# Patient Record
Sex: FEMALE | Race: WHITE | HISPANIC OR LATINO | Employment: OTHER | URBAN - METROPOLITAN AREA
[De-identification: names, ages, dates, MRNs, and addresses within clinical notes are randomized per-mention and may not be internally consistent; named-entity substitution may affect disease eponyms.]

---

## 2023-03-19 ENCOUNTER — HOSPITAL ENCOUNTER (EMERGENCY)
Facility: HOSPITAL | Age: 58
Discharge: HOME | End: 2023-03-20
Attending: STUDENT IN AN ORGANIZED HEALTH CARE EDUCATION/TRAINING PROGRAM | Admitting: STUDENT IN AN ORGANIZED HEALTH CARE EDUCATION/TRAINING PROGRAM
Payer: MEDICARE

## 2023-03-19 DIAGNOSIS — T50.905A ADVERSE EFFECT OF DRUG, INITIAL ENCOUNTER: ICD-10-CM

## 2023-03-19 DIAGNOSIS — N39.0 ACUTE UTI (URINARY TRACT INFECTION): Primary | ICD-10-CM

## 2023-03-19 DIAGNOSIS — R09.89 VEIN SYMPTOM: ICD-10-CM

## 2023-03-19 LAB
BASOPHILS # BLD AUTO: 0.04 10*3/MM3 (ref 0–0.2)
BASOPHILS NFR BLD AUTO: 0.4 % (ref 0–1.5)
DEPRECATED RDW RBC AUTO: 40.6 FL (ref 37–54)
EOSINOPHIL # BLD AUTO: 0.12 10*3/MM3 (ref 0–0.4)
EOSINOPHIL NFR BLD AUTO: 1.1 % (ref 0.3–6.2)
ERYTHROCYTE [DISTWIDTH] IN BLOOD BY AUTOMATED COUNT: 11.6 % (ref 12.3–15.4)
HCT VFR BLD AUTO: 41 % (ref 34–46.6)
HGB BLD-MCNC: 13.6 G/DL (ref 12–15.9)
IMM GRANULOCYTES # BLD AUTO: 0.04 10*3/MM3 (ref 0–0.05)
IMM GRANULOCYTES NFR BLD AUTO: 0.4 % (ref 0–0.5)
LYMPHOCYTES # BLD AUTO: 3.6 10*3/MM3 (ref 0.7–3.1)
LYMPHOCYTES NFR BLD AUTO: 32.3 % (ref 19.6–45.3)
MCH RBC QN AUTO: 32 PG (ref 26.6–33)
MCHC RBC AUTO-ENTMCNC: 33.2 G/DL (ref 31.5–35.7)
MCV RBC AUTO: 96.5 FL (ref 79–97)
MONOCYTES # BLD AUTO: 0.75 10*3/MM3 (ref 0.1–0.9)
MONOCYTES NFR BLD AUTO: 6.7 % (ref 5–12)
NEUTROPHILS NFR BLD AUTO: 59.1 % (ref 42.7–76)
NEUTROPHILS NFR BLD AUTO: 6.59 10*3/MM3 (ref 1.7–7)
NRBC BLD AUTO-RTO: 0 /100 WBC (ref 0–0.2)
PLATELET # BLD AUTO: 351 10*3/MM3 (ref 140–450)
PMV BLD AUTO: 8.1 FL (ref 6–12)
RBC # BLD AUTO: 4.25 10*6/MM3 (ref 3.77–5.28)
WBC NRBC COR # BLD: 11.14 10*3/MM3 (ref 3.4–10.8)

## 2023-03-19 PROCEDURE — 80143 DRUG ASSAY ACETAMINOPHEN: CPT | Performed by: STUDENT IN AN ORGANIZED HEALTH CARE EDUCATION/TRAINING PROGRAM

## 2023-03-19 PROCEDURE — 84100 ASSAY OF PHOSPHORUS: CPT | Performed by: STUDENT IN AN ORGANIZED HEALTH CARE EDUCATION/TRAINING PROGRAM

## 2023-03-19 PROCEDURE — 80175 DRUG SCREEN QUAN LAMOTRIGINE: CPT | Performed by: STUDENT IN AN ORGANIZED HEALTH CARE EDUCATION/TRAINING PROGRAM

## 2023-03-19 PROCEDURE — 82550 ASSAY OF CK (CPK): CPT | Performed by: STUDENT IN AN ORGANIZED HEALTH CARE EDUCATION/TRAINING PROGRAM

## 2023-03-19 PROCEDURE — 81001 URINALYSIS AUTO W/SCOPE: CPT | Performed by: STUDENT IN AN ORGANIZED HEALTH CARE EDUCATION/TRAINING PROGRAM

## 2023-03-19 PROCEDURE — 99284 EMERGENCY DEPT VISIT MOD MDM: CPT

## 2023-03-19 PROCEDURE — 82077 ASSAY SPEC XCP UR&BREATH IA: CPT | Performed by: STUDENT IN AN ORGANIZED HEALTH CARE EDUCATION/TRAINING PROGRAM

## 2023-03-19 PROCEDURE — 83735 ASSAY OF MAGNESIUM: CPT | Performed by: STUDENT IN AN ORGANIZED HEALTH CARE EDUCATION/TRAINING PROGRAM

## 2023-03-19 PROCEDURE — 85025 COMPLETE CBC W/AUTO DIFF WBC: CPT | Performed by: STUDENT IN AN ORGANIZED HEALTH CARE EDUCATION/TRAINING PROGRAM

## 2023-03-19 PROCEDURE — 84443 ASSAY THYROID STIM HORMONE: CPT | Performed by: STUDENT IN AN ORGANIZED HEALTH CARE EDUCATION/TRAINING PROGRAM

## 2023-03-19 PROCEDURE — 80053 COMPREHEN METABOLIC PANEL: CPT | Performed by: STUDENT IN AN ORGANIZED HEALTH CARE EDUCATION/TRAINING PROGRAM

## 2023-03-19 PROCEDURE — 80306 DRUG TEST PRSMV INSTRMNT: CPT | Performed by: STUDENT IN AN ORGANIZED HEALTH CARE EDUCATION/TRAINING PROGRAM

## 2023-03-19 PROCEDURE — 80179 DRUG ASSAY SALICYLATE: CPT | Performed by: STUDENT IN AN ORGANIZED HEALTH CARE EDUCATION/TRAINING PROGRAM

## 2023-03-19 PROCEDURE — 84439 ASSAY OF FREE THYROXINE: CPT | Performed by: STUDENT IN AN ORGANIZED HEALTH CARE EDUCATION/TRAINING PROGRAM

## 2023-03-19 RX ORDER — DIPHENHYDRAMINE HYDROCHLORIDE 50 MG/ML
25 INJECTION INTRAMUSCULAR; INTRAVENOUS ONCE
Status: COMPLETED | OUTPATIENT
Start: 2023-03-19 | End: 2023-03-20

## 2023-03-20 VITALS
WEIGHT: 117 LBS | HEIGHT: 62 IN | RESPIRATION RATE: 18 BRPM | OXYGEN SATURATION: 100 % | BODY MASS INDEX: 21.53 KG/M2 | SYSTOLIC BLOOD PRESSURE: 141 MMHG | DIASTOLIC BLOOD PRESSURE: 79 MMHG | HEART RATE: 83 BPM | TEMPERATURE: 98 F

## 2023-03-20 LAB
ALBUMIN SERPL-MCNC: 4 G/DL (ref 3.5–5.2)
ALBUMIN/GLOB SERPL: 1.4 G/DL
ALP SERPL-CCNC: 80 U/L (ref 39–117)
ALT SERPL W P-5'-P-CCNC: 6 U/L (ref 1–33)
AMPHET+METHAMPHET UR QL: NEGATIVE
AMPHETAMINES UR QL: NEGATIVE
ANION GAP SERPL CALCULATED.3IONS-SCNC: 12 MMOL/L (ref 5–15)
APAP SERPL-MCNC: <5 MCG/ML (ref 0–30)
AST SERPL-CCNC: 13 U/L (ref 1–32)
BACTERIA UR QL AUTO: ABNORMAL /HPF
BARBITURATES UR QL SCN: NEGATIVE
BENZODIAZ UR QL SCN: NEGATIVE
BILIRUB SERPL-MCNC: <0.2 MG/DL (ref 0–1.2)
BILIRUB UR QL STRIP: NEGATIVE
BUN SERPL-MCNC: 11 MG/DL (ref 6–20)
BUN/CREAT SERPL: 22 (ref 7–25)
BUPRENORPHINE SERPL-MCNC: NEGATIVE NG/ML
CALCIUM SPEC-SCNC: 8.7 MG/DL (ref 8.6–10.5)
CANNABINOIDS SERPL QL: NEGATIVE
CHLORIDE SERPL-SCNC: 106 MMOL/L (ref 98–107)
CK SERPL-CCNC: 157 U/L (ref 20–180)
CLARITY UR: CLEAR
CO2 SERPL-SCNC: 25 MMOL/L (ref 22–29)
COCAINE UR QL: NEGATIVE
COLOR UR: YELLOW
CREAT SERPL-MCNC: 0.5 MG/DL (ref 0.57–1)
EGFRCR SERPLBLD CKD-EPI 2021: 108.9 ML/MIN/1.73
ETHANOL BLD-MCNC: <10 MG/DL (ref 0–10)
GLOBULIN UR ELPH-MCNC: 2.8 GM/DL
GLUCOSE SERPL-MCNC: 122 MG/DL (ref 65–99)
GLUCOSE UR STRIP-MCNC: NEGATIVE MG/DL
HGB UR QL STRIP.AUTO: ABNORMAL
HYALINE CASTS UR QL AUTO: ABNORMAL /LPF
KETONES UR QL STRIP: NEGATIVE
LEUKOCYTE ESTERASE UR QL STRIP.AUTO: ABNORMAL
MAGNESIUM SERPL-MCNC: 2.2 MG/DL (ref 1.6–2.6)
METHADONE UR QL SCN: NEGATIVE
NITRITE UR QL STRIP: NEGATIVE
OPIATES UR QL: NEGATIVE
OXYCODONE UR QL SCN: NEGATIVE
PCP UR QL SCN: NEGATIVE
PH UR STRIP.AUTO: 6 [PH] (ref 5–8)
PHOSPHATE SERPL-MCNC: 3.3 MG/DL (ref 2.5–4.5)
POTASSIUM SERPL-SCNC: 4.1 MMOL/L (ref 3.5–5.2)
PROPOXYPH UR QL: NEGATIVE
PROT SERPL-MCNC: 6.8 G/DL (ref 6–8.5)
PROT UR QL STRIP: NEGATIVE
RBC # UR STRIP: ABNORMAL /HPF
REF LAB TEST METHOD: ABNORMAL
SALICYLATES SERPL-MCNC: 0.5 MG/DL
SODIUM SERPL-SCNC: 143 MMOL/L (ref 136–145)
SP GR UR STRIP: 1.01 (ref 1–1.03)
SQUAMOUS #/AREA URNS HPF: ABNORMAL /HPF
T4 FREE SERPL-MCNC: 1.05 NG/DL (ref 0.93–1.7)
TRICYCLICS UR QL SCN: NEGATIVE
TSH SERPL DL<=0.05 MIU/L-ACNC: 1.6 UIU/ML (ref 0.27–4.2)
UROBILINOGEN UR QL STRIP: ABNORMAL
WBC # UR STRIP: ABNORMAL /HPF

## 2023-03-20 PROCEDURE — 96361 HYDRATE IV INFUSION ADD-ON: CPT

## 2023-03-20 PROCEDURE — 25010000002 DIPHENHYDRAMINE PER 50 MG: Performed by: STUDENT IN AN ORGANIZED HEALTH CARE EDUCATION/TRAINING PROGRAM

## 2023-03-20 PROCEDURE — 96374 THER/PROPH/DIAG INJ IV PUSH: CPT

## 2023-03-20 RX ORDER — NITROFURANTOIN 25; 75 MG/1; MG/1
100 CAPSULE ORAL 2 TIMES DAILY
Qty: 14 CAPSULE | Refills: 0 | Status: SHIPPED | OUTPATIENT
Start: 2023-03-20

## 2023-03-20 RX ADMIN — DIPHENHYDRAMINE HYDROCHLORIDE 25 MG: 50 INJECTION, SOLUTION INTRAMUSCULAR; INTRAVENOUS at 00:22

## 2023-03-20 RX ADMIN — SODIUM CHLORIDE, POTASSIUM CHLORIDE, SODIUM LACTATE AND CALCIUM CHLORIDE 1000 ML: 600; 310; 30; 20 INJECTION, SOLUTION INTRAVENOUS at 00:22

## 2023-03-20 NOTE — DISCHARGE INSTRUCTIONS
Take the provided antibiotic as directed.  Follow-up with your PCP for possible medication changes your symptoms could be related to that.  While today's work-up was reassuring if your symptoms change or worsen please return to the ED or seek other medical care.

## 2023-03-22 LAB — LAMOTRIGINE SERPL-MCNC: <1 UG/ML (ref 2–20)

## 2023-03-22 NOTE — ED PROVIDER NOTES
EMERGENCY DEPARTMENT ENCOUNTER    Pt Name: Sarah Beth Patel  MRN: [id number]  Pt :   1965  Room Number:  HAL1/HA  Date of encounter:  3/19/2023  PCP: Mervat Chang MD  ED Provider: Juan Yan MD    Historian: Patient      HPI:  Chief Complaint: Diffuse myalgias, veins swelling, drug reaction        Context: Sarah Beth Patel is a 58-year-old woman who presents with a constellation of symptoms she believes may be a drug reaction.  She says on Monday nearly a week ago she was started on lisinopril HCTZ.  She had done well with that medication through the week but then yesterday started taking lamotrigine which she was written by her psychiatrist for and since then feels like she has been having pain in her veins and swelling in her veins.  She feels like the pain is diffuse at 1 point she felt like there was a ball of ice in her veins.  She says before she came here she felt like there was more swelling but that it has gotten better since she called the ambulance.  She was worried that she was having a medication reaction so she presents here for evaluation.  She continues to have the sensation that her veins are swollen but denies actual pain.  No other complaints at this time.    Note: Patient is Azeri-speaking and both initial history and reevaluation and discharge were performed with a formal .    PAST MEDICAL HISTORY  No past medical history on file.      PAST SURGICAL HISTORY  No past surgical history on file.      FAMILY HISTORY  No family history on file.      SOCIAL HISTORY  Social History     Socioeconomic History   • Marital status: Legally          ALLERGIES  Asa [aspirin]        REVIEW OF SYSTEMS  Review of Systems       All systems reviewed and negative except for those discussed in HPI.       PHYSICAL EXAM    I have reviewed the triage vital signs and nursing notes.    ED Triage Vitals [23 2242]   Temp Heart Rate Resp BP SpO2    98 °F (36.7 °C) 98 18 173/93 94 %      Temp src Heart Rate Source Patient Position BP Location FiO2 (%)   Oral Monitor Sitting Left arm --       Physical Exam  GENERAL:   Appears in no acute distress.   HENT: Nares patent.  EYES: No scleral icterus.  CV: Regular rhythm, regular rate.  RESPIRATORY: Normal effort.  No audible wheezes, rales or rhonchi.  ABDOMEN: Soft, nontender  MUSCULOSKELETAL: No deformities.  I do not appreciate the vein swelling that the patient is concerned about.  NEURO: Alert, moves all extremities, follows commands.  SKIN: Warm, dry, no rash visualized.      LAB RESULTS  No results found for this or any previous visit (from the past 24 hour(s)).    If labs were ordered, I independently reviewed the results and considered them in treating the patient.        RADIOLOGY  No Radiology Exams Resulted Within Past 24 Hours    I ordered and independently reviewed the above noted radiographic studies.        See radiologist's dictation for official interpretation.        PROCEDURES    Procedures    No orders to display       MEDICATIONS GIVEN IN ER    Medications   lactated ringers bolus 1,000 mL (0 mL Intravenous Stopped 3/20/23 0122)   diphenhydrAMINE (BENADRYL) injection 25 mg (25 mg Intravenous Given 3/20/23 0022)         MEDICAL DECISION MAKING, PROGRESS, and CONSULTS    All labs have been independently reviewed by me.  All radiology studies have been reviewed by me and the radiologist dictating the report.  All EKG's have been independently viewed and interpreted by me.      Discussion below represents my analysis of pertinent findings related to patient's condition, differential diagnosis, treatment plan and final disposition.      Differential diagnosis:    Medication adverse reaction, DVT, sepsis, anemia, electrolyte abnormality, intoxication, rhabdomyolysis, hyperthyroidism, urinary tract infection      Additional sources:    - Discussed/ obtained information from independent historians:  EMS    - External (non-ED) record review: No available records on chart review    - Chronic or social conditions impacting care: Language barrier    - Shared decision making:        Orders placed during this visit:  Orders Placed This Encounter   Procedures   • Comprehensive Metabolic Panel   • Urinalysis With Microscopic If Indicated (No Culture) - Urine, Clean Catch   • Urine Drug Screen - Urine, Clean Catch   • Salicylate Level   • Ethanol   • Acetaminophen Level   • Magnesium   • Phosphorus   • TSH   • T4, Free   • CBC Auto Differential   • CK   • Urinalysis, Microscopic Only - Urine, Clean Catch   • CBC & Differential         Additional orders considered but not ordered:      ED Course:    Consultants:      ED Course as of 03/21/23 2213   Sun Mar 19, 2023   2332 In summary is a 58-year-old woman who presents with a constellation of symptoms she believes may be a drug reaction.  She says on Monday nearly a week ago she was started on lisinopril HCTZ.  She had done well with that medication through the week but then yesterday started taking lamotrigine which she was written by her psychiatrist for and since then feels like she has been having pain in her veins and swelling in her veins.  She feels like the pain is diffuse at 1 point she felt like there was a ball of ice in her veins.  She says before she came here she felt like there was more swelling but that it has gotten better since she called the ambulance.  She was worried that she was having a medication reaction so she presents here for evaluation.  She continues to have the sensation that her veins are swollen but denies actual pain.  No other complaints at this time. [CC]      ED Course User Index  [CC] Juan Yan MD     Patient arrived awake and alert mildly hypertensive otherwise vitals are within normal limits.  She was concerned that she is having a drug reaction because of her constellation of symptoms she did recently start lamotrigine  and I advised her if she is having any symptoms she should contact her prescriber and for the time being stop taking that medication.  She is concerned that her veins are swelling because she can see them in her arms.  She says was somewhat reassured by me placing my arm next to hers and showing her that my arm also has visible veins.  CBC and CMP obtained and generally reassuring and nonactionable.  Toxicologic screening is negative.  Normal magnesium and phosphorus.  No elevation in creatinine kinase, normal thyroid function panel.  Urinalysis does have hematuria and pyuria consistent with cystitis.  Discussed the findings with her she feels well upon reevaluation she will stop taking her lamotrigine until she follows up with her prescriber first thing Monday morning.  Provided nitrofurantoin for acute cystitis.  Return precautions verbally expressed understanding of these.             AS OF 22:13 EDT VITALS:    BP - 141/79  HR - 83  TEMP - 98 °F (36.7 °C) (Oral)  O2 SATS - 100%                  DIAGNOSIS  Final diagnoses:   Acute UTI (urinary tract infection)   Adverse effect of drug, initial encounter   Vein symptom         DISPOSITION  DISCHARGE    Patient discharged in stable condition.    Reviewed implications of results, diagnosis, meds, responsibility to follow up, warning signs and symptoms of possible worsening, potential complications and reasons to return to ER.    Patient/Family voiced understanding of above instructions.    Discussed plan for discharge, as there is no emergent indication for admission.  Pt/family is agreeable and understands need for follow up and possible repeat testing.  Pt/family is aware that discharge does not mean that nothing is wrong but that it indicates no emergency is currently present that requires admission and they must continue care with follow-up as given below or with a physician of their choice.     FOLLOW-UP  Mervat Chang MD  [address] [place] [address]  [place] [address] [phone]    Call   For follow-up, and reevaluation of your medications         Medication List      New Prescriptions    nitrofurantoin (macrocrystal-monohydrate) 100 MG capsule  Commonly known as: MACROBID  Take 1 capsule by mouth 2 (Two) Times a Day.           Where to Get Your Medications      These medications were sent to [id number] - [place], [place] - [address] AT [facility] [address] - [phone] [facility] [phone] FX  [address] [facility] [facility] [address]    Phone: [phone]   · nitrofurantoin (macrocrystal-monohydrate) 100 MG capsule             Please note that portions of this document were completed with voice recognition software.        Juan Yan MD  03/21/23 3861

## 2023-03-28 ENCOUNTER — APPOINTMENT (OUTPATIENT)
Dept: GENERAL RADIOLOGY | Facility: HOSPITAL | Age: 58
End: 2023-03-28
Payer: MEDICARE

## 2023-03-28 ENCOUNTER — HOSPITAL ENCOUNTER (EMERGENCY)
Facility: HOSPITAL | Age: 58
Discharge: HOME | End: 2023-03-28
Attending: EMERGENCY MEDICINE | Admitting: EMERGENCY MEDICINE
Payer: MEDICARE

## 2023-03-28 VITALS
HEIGHT: 62 IN | BODY MASS INDEX: 21.16 KG/M2 | TEMPERATURE: 98.6 F | HEART RATE: 115 BPM | WEIGHT: 115 LBS | RESPIRATION RATE: 22 BRPM | OXYGEN SATURATION: 99 % | DIASTOLIC BLOOD PRESSURE: 89 MMHG | SYSTOLIC BLOOD PRESSURE: 161 MMHG

## 2023-03-28 DIAGNOSIS — F41.9 ANXIETY: ICD-10-CM

## 2023-03-28 DIAGNOSIS — M79.10 MYALGIA: Primary | ICD-10-CM

## 2023-03-28 PROCEDURE — 71045 X-RAY EXAM CHEST 1 VIEW: CPT

## 2023-03-28 PROCEDURE — 99283 EMERGENCY DEPT VISIT LOW MDM: CPT

## 2023-03-28 RX ORDER — METHYLPREDNISOLONE 4 MG/1
TABLET ORAL
Qty: 21 TABLET | Refills: 0 | OUTPATIENT
Start: 2023-03-28 | End: 2023-04-15

## 2023-03-28 RX ORDER — ACETAMINOPHEN 500 MG
1000 TABLET ORAL ONCE
Status: COMPLETED | OUTPATIENT
Start: 2023-03-28 | End: 2023-03-28

## 2023-03-28 RX ADMIN — ACETAMINOPHEN 1000 MG: 500 TABLET ORAL at 12:29

## 2023-03-28 NOTE — DISCHARGE INSTRUCTIONS
Use Tylenol and steroid pack.        Follow-up with your primary care doctor as planned on Friday.

## 2023-03-28 NOTE — ED PROVIDER NOTES
Subjective   History of Present Illness  Sharda Patel is a 58-year-old female that presents emergency department with complaints of multiple issues.  Patient over the past week has been on multiple different medications that she feels that she is not reacting well to.  She has been on psych medication called Lamictal was also started on a antihypertensive with a diuretic.  Patient today complains of neck pain which she feels like it is causing her difficulty with breathing.  Patient is able to swallow without difficulty.  Patient's oxygen saturation are 95%.  Patient is in no acute distress.  Patient does  appear anxious.  Patient is tearful at times.  Patient has been to her PCP, psychiatrist multiple times.  Patient has an appointment to see her PCP on Friday.  Patient explains that she needs an anti-inflammatory but is allergic to aspirin.    History provided by:  Patient   used: No    Pain  Location:  Neck, bilateral knees, bilateral arms, feet.   Severity:  Unable to specify  Associated symptoms: myalgias and shortness of breath    Associated symptoms: no chest pain, no fever, no nausea and no vomiting        Review of Systems   Constitutional: Negative for chills and fever.   Respiratory: Positive for shortness of breath.    Cardiovascular: Negative for chest pain.   Gastrointestinal: Negative for nausea and vomiting.   Musculoskeletal: Positive for arthralgias, myalgias and neck pain.   Psychiatric/Behavioral: The patient is nervous/anxious.    All other systems reviewed and are negative.      No past medical history on file.    Allergies   Allergen Reactions   • Asa [Aspirin] Rash       No past surgical history on file.    No family history on file.    Social History     Socioeconomic History   • Marital status: Legally            Objective   Physical Exam  Vitals and nursing note reviewed.   Constitutional:       Appearance: Normal appearance. She is well-developed. She is  not toxic-appearing.      Comments: Patient is anxious and tearful.   used.   HENT:      Head: Normocephalic and atraumatic.   Eyes:      General: Lids are normal.      Conjunctiva/sclera: Conjunctivae normal.   Neck:      Trachea: Trachea normal.   Cardiovascular:      Rate and Rhythm: Regular rhythm.      Pulses: Normal pulses.      Heart sounds: Normal heart sounds.   Pulmonary:      Effort: Pulmonary effort is normal. No respiratory distress.      Breath sounds: Normal breath sounds. No decreased breath sounds, wheezing, rhonchi or rales.   Abdominal:      General: Bowel sounds are normal.      Palpations: Abdomen is soft.      Tenderness: There is no abdominal tenderness.   Musculoskeletal:         General: Normal range of motion.      Cervical back: Full passive range of motion without pain and normal range of motion.   Skin:     General: Skin is warm and dry.      Findings: No rash.   Neurological:      Mental Status: She is alert and oriented to person, place, and time.      Cranial Nerves: No cranial nerve deficit.   Psychiatric:         Speech: Speech normal.         Behavior: Behavior normal. Behavior is cooperative.         Procedures           ED Course  ED Course as of 03/28/23 1327   Tue Mar 28, 2023   1200 Sharda Patel is a 58-year-old female that presents emergency department with complaints of multiple issues.  Patient over the past week has been on multiple different medications that she feels that she is not reacting well to.  She has been on psych medication called Lamictal was also started on a antihypertensive with a diuretic.  Patient today complains of neck pain which she feels like it is causing her difficulty with breathing.  Patient is able to swallow without difficulty.  Patient's oxygen saturation are 95%.  Patient is in no acute distress.  Patient does  appear anxious.  Patient is tearful at times.  Patient has been to her PCP, psychiatrist multiple times.   "Patient has an appointment to see her PCP on Friday.  Patient explains that she needs an anti-inflammatory but is allergic to aspirin.   [KG]   1210 Differential diagnosis anxiety, myalgias, sprain strain, arthritis. [KG]   1325 X-ray is reviewed.  X-ray is normal no sign of dislocations, fractures.  Patient will be discharged home with a steroid pack.  Patient to take Tylenol for discomfort. [KG]      ED Course User Index  [KG] Farrah Lyles, ALVARO           No results found for this or any previous visit (from the past 24 hour(s)).  Note: In addition to lab results from this visit, the labs listed above may include labs taken at another facility or during a different encounter within the last 24 hours. Please correlate lab times with ED admission and discharge times for further clarification of the services performed during this visit.    XR Chest 1 View   Final Result   Impression:   1.An acute pulmonary process is not apparent.      Electronically Signed: Ananda Linder     3/28/2023 1:10 PM EDT     Workstation ID: [id number]        Vitals:    03/28/23 1123   BP: 161/89   BP Location: Left arm   Patient Position: Sitting   Pulse: 115   Resp: 22   Temp: 98.6 °F (37 °C)   TempSrc: Oral   SpO2: 99%   Weight: 52.2 kg (115 lb)   Height: 157.5 cm (62\")     Medications   acetaminophen (TYLENOL) tablet 1,000 mg (1,000 mg Oral Given 3/28/23 1229)     ECG/EMG Results (last 24 hours)     ** No results found for the last 24 hours. **        No orders to display                                       MDM    Final diagnoses:   Myalgia   Anxiety       ED Disposition  ED Disposition     ED Disposition   Discharge    Condition   Stable    Comment   --             Mervat Chang MD  [address] [place] [id number] [phone]               Medication List      New Prescriptions    methylPREDNISolone 4 MG dose pack  Commonly known as: MEDROL  Take as directed on package instructions.           Where to Get Your " Medications      These medications were sent to [id number] - [place], [place] - [address] AT [facility] [address] - [phone]  - [phone] FX  [address], [facility] [address]    Phone: [phone]   · methylPREDNISolone 4 MG dose pack         Farrah Lyles, APRN  03/28/23 8367

## 2023-04-15 ENCOUNTER — APPOINTMENT (OUTPATIENT)
Dept: CT IMAGING | Facility: HOSPITAL | Age: 58
End: 2023-04-15
Payer: MEDICARE

## 2023-04-15 ENCOUNTER — HOSPITAL ENCOUNTER (EMERGENCY)
Facility: HOSPITAL | Age: 58
Discharge: HOME | End: 2023-04-15
Attending: EMERGENCY MEDICINE | Admitting: EMERGENCY MEDICINE
Payer: MEDICARE

## 2023-04-15 VITALS
TEMPERATURE: 98.6 F | BODY MASS INDEX: 21.53 KG/M2 | WEIGHT: 117 LBS | SYSTOLIC BLOOD PRESSURE: 173 MMHG | HEIGHT: 62 IN | DIASTOLIC BLOOD PRESSURE: 101 MMHG | HEART RATE: 98 BPM | OXYGEN SATURATION: 100 % | RESPIRATION RATE: 20 BRPM

## 2023-04-15 DIAGNOSIS — Z86.59 HISTORY OF ANXIETY: ICD-10-CM

## 2023-04-15 DIAGNOSIS — R10.10 PAIN OF UPPER ABDOMEN: ICD-10-CM

## 2023-04-15 DIAGNOSIS — R13.10 DYSPHAGIA, UNSPECIFIED TYPE: ICD-10-CM

## 2023-04-15 DIAGNOSIS — R07.9 CHEST PAIN, UNSPECIFIED TYPE: Primary | ICD-10-CM

## 2023-04-15 LAB
ALBUMIN SERPL-MCNC: 4.3 G/DL (ref 3.5–5.2)
ALBUMIN/GLOB SERPL: 1.7 G/DL
ALP SERPL-CCNC: 84 U/L (ref 39–117)
ALT SERPL W P-5'-P-CCNC: 14 U/L (ref 1–33)
ANION GAP SERPL CALCULATED.3IONS-SCNC: 12 MMOL/L (ref 5–15)
AST SERPL-CCNC: 15 U/L (ref 1–32)
BACTERIA UR QL AUTO: NORMAL /HPF
BASOPHILS # BLD AUTO: 0.03 10*3/MM3 (ref 0–0.2)
BASOPHILS NFR BLD AUTO: 0.3 % (ref 0–1.5)
BILIRUB SERPL-MCNC: 0.2 MG/DL (ref 0–1.2)
BILIRUB UR QL STRIP: NEGATIVE
BUN SERPL-MCNC: 8 MG/DL (ref 6–20)
BUN/CREAT SERPL: 12.7 (ref 7–25)
CALCIUM SPEC-SCNC: 9.4 MG/DL (ref 8.6–10.5)
CHLORIDE SERPL-SCNC: 103 MMOL/L (ref 98–107)
CLARITY UR: CLEAR
CO2 SERPL-SCNC: 25 MMOL/L (ref 22–29)
COLOR UR: YELLOW
CREAT SERPL-MCNC: 0.63 MG/DL (ref 0.57–1)
DEPRECATED RDW RBC AUTO: 42.1 FL (ref 37–54)
EGFRCR SERPLBLD CKD-EPI 2021: 103 ML/MIN/1.73
EOSINOPHIL # BLD AUTO: 0.13 10*3/MM3 (ref 0–0.4)
EOSINOPHIL NFR BLD AUTO: 1.5 % (ref 0.3–6.2)
ERYTHROCYTE [DISTWIDTH] IN BLOOD BY AUTOMATED COUNT: 12 % (ref 12.3–15.4)
GLOBULIN UR ELPH-MCNC: 2.6 GM/DL
GLUCOSE SERPL-MCNC: 90 MG/DL (ref 65–99)
GLUCOSE UR STRIP-MCNC: NEGATIVE MG/DL
HCT VFR BLD AUTO: 42.1 % (ref 34–46.6)
HGB BLD-MCNC: 14.3 G/DL (ref 12–15.9)
HGB UR QL STRIP.AUTO: NEGATIVE
HOLD SPECIMEN: NORMAL
HYALINE CASTS UR QL AUTO: NORMAL /LPF
IMM GRANULOCYTES # BLD AUTO: 0.03 10*3/MM3 (ref 0–0.05)
IMM GRANULOCYTES NFR BLD AUTO: 0.3 % (ref 0–0.5)
KETONES UR QL STRIP: NEGATIVE
LEUKOCYTE ESTERASE UR QL STRIP.AUTO: ABNORMAL
LIPASE SERPL-CCNC: 16 U/L (ref 13–60)
LYMPHOCYTES # BLD AUTO: 3.12 10*3/MM3 (ref 0.7–3.1)
LYMPHOCYTES NFR BLD AUTO: 35.3 % (ref 19.6–45.3)
MCH RBC QN AUTO: 32.4 PG (ref 26.6–33)
MCHC RBC AUTO-ENTMCNC: 34 G/DL (ref 31.5–35.7)
MCV RBC AUTO: 95.5 FL (ref 79–97)
MONOCYTES # BLD AUTO: 0.36 10*3/MM3 (ref 0.1–0.9)
MONOCYTES NFR BLD AUTO: 4.1 % (ref 5–12)
NEUTROPHILS NFR BLD AUTO: 5.16 10*3/MM3 (ref 1.7–7)
NEUTROPHILS NFR BLD AUTO: 58.5 % (ref 42.7–76)
NITRITE UR QL STRIP: NEGATIVE
NRBC BLD AUTO-RTO: 0 /100 WBC (ref 0–0.2)
NT-PROBNP SERPL-MCNC: 115.9 PG/ML (ref 0–900)
PH UR STRIP.AUTO: 8 [PH] (ref 5–8)
PLATELET # BLD AUTO: 398 10*3/MM3 (ref 140–450)
PMV BLD AUTO: 7.9 FL (ref 6–12)
POTASSIUM SERPL-SCNC: 4.4 MMOL/L (ref 3.5–5.2)
PROT SERPL-MCNC: 6.9 G/DL (ref 6–8.5)
PROT UR QL STRIP: NEGATIVE
RBC # BLD AUTO: 4.41 10*6/MM3 (ref 3.77–5.28)
RBC # UR STRIP: NORMAL /HPF
REF LAB TEST METHOD: NORMAL
SODIUM SERPL-SCNC: 140 MMOL/L (ref 136–145)
SP GR UR STRIP: 1.02 (ref 1–1.03)
SQUAMOUS #/AREA URNS HPF: NORMAL /HPF
TROPONIN T SERPL HS-MCNC: <6 NG/L
UROBILINOGEN UR QL STRIP: ABNORMAL
WBC # UR STRIP: NORMAL /HPF
WBC NRBC COR # BLD: 8.83 10*3/MM3 (ref 3.4–10.8)
WHOLE BLOOD HOLD COAG: NORMAL
WHOLE BLOOD HOLD SPECIMEN: NORMAL

## 2023-04-15 PROCEDURE — 96375 TX/PRO/DX INJ NEW DRUG ADDON: CPT

## 2023-04-15 PROCEDURE — 83690 ASSAY OF LIPASE: CPT | Performed by: PHYSICIAN ASSISTANT

## 2023-04-15 PROCEDURE — 71275 CT ANGIOGRAPHY CHEST: CPT

## 2023-04-15 PROCEDURE — 85025 COMPLETE CBC W/AUTO DIFF WBC: CPT | Performed by: PHYSICIAN ASSISTANT

## 2023-04-15 PROCEDURE — 96374 THER/PROPH/DIAG INJ IV PUSH: CPT

## 2023-04-15 PROCEDURE — 25510000001 IOPAMIDOL PER 1 ML: Performed by: EMERGENCY MEDICINE

## 2023-04-15 PROCEDURE — 99284 EMERGENCY DEPT VISIT MOD MDM: CPT

## 2023-04-15 PROCEDURE — 81001 URINALYSIS AUTO W/SCOPE: CPT | Performed by: PHYSICIAN ASSISTANT

## 2023-04-15 PROCEDURE — 25010000002 METHYLPREDNISOLONE PER 40 MG: Performed by: PHYSICIAN ASSISTANT

## 2023-04-15 PROCEDURE — 80053 COMPREHEN METABOLIC PANEL: CPT | Performed by: PHYSICIAN ASSISTANT

## 2023-04-15 PROCEDURE — 84484 ASSAY OF TROPONIN QUANT: CPT | Performed by: PHYSICIAN ASSISTANT

## 2023-04-15 PROCEDURE — 83880 ASSAY OF NATRIURETIC PEPTIDE: CPT | Performed by: PHYSICIAN ASSISTANT

## 2023-04-15 PROCEDURE — 74177 CT ABD & PELVIS W/CONTRAST: CPT

## 2023-04-15 PROCEDURE — 25010000002 KETOROLAC TROMETHAMINE PER 15 MG: Performed by: PHYSICIAN ASSISTANT

## 2023-04-15 RX ORDER — HYDROXYZINE HYDROCHLORIDE 25 MG/1
25 TABLET, FILM COATED ORAL ONCE
Status: COMPLETED | OUTPATIENT
Start: 2023-04-15 | End: 2023-04-15

## 2023-04-15 RX ORDER — HYDROXYZINE HYDROCHLORIDE 25 MG/1
25 TABLET, FILM COATED ORAL 3 TIMES DAILY PRN
Qty: 12 TABLET | Refills: 0 | Status: SHIPPED | OUTPATIENT
Start: 2023-04-15 | End: 2023-04-24

## 2023-04-15 RX ORDER — KETOROLAC TROMETHAMINE 15 MG/ML
15 INJECTION, SOLUTION INTRAMUSCULAR; INTRAVENOUS ONCE
Status: COMPLETED | OUTPATIENT
Start: 2023-04-15 | End: 2023-04-15

## 2023-04-15 RX ORDER — SODIUM CHLORIDE 0.9 % (FLUSH) 0.9 %
10 SYRINGE (ML) INJECTION AS NEEDED
Status: DISCONTINUED | OUTPATIENT
Start: 2023-04-15 | End: 2023-04-15 | Stop reason: HOSPADM

## 2023-04-15 RX ORDER — FAMOTIDINE 40 MG/1
40 TABLET, FILM COATED ORAL DAILY
Qty: 30 TABLET | Refills: 0 | Status: SHIPPED | OUTPATIENT
Start: 2023-04-15 | End: 2023-04-24

## 2023-04-15 RX ORDER — METHYLPREDNISOLONE SODIUM SUCCINATE 40 MG/ML
80 INJECTION, POWDER, LYOPHILIZED, FOR SOLUTION INTRAMUSCULAR; INTRAVENOUS ONCE
Status: COMPLETED | OUTPATIENT
Start: 2023-04-15 | End: 2023-04-15

## 2023-04-15 RX ADMIN — KETOROLAC TROMETHAMINE 15 MG: 15 INJECTION, SOLUTION INTRAMUSCULAR; INTRAVENOUS at 12:42

## 2023-04-15 RX ADMIN — METHYLPREDNISOLONE SODIUM SUCCINATE 80 MG: 40 INJECTION, POWDER, FOR SOLUTION INTRAMUSCULAR; INTRAVENOUS at 12:42

## 2023-04-15 RX ADMIN — HYDROXYZINE HYDROCHLORIDE 25 MG: 25 TABLET, FILM COATED ORAL at 12:41

## 2023-04-15 RX ADMIN — IOPAMIDOL 85 ML: 755 INJECTION, SOLUTION INTRAVENOUS at 13:49

## 2023-04-15 RX ADMIN — SODIUM CHLORIDE 1000 ML: 9 INJECTION, SOLUTION INTRAVENOUS at 13:00

## 2023-04-15 NOTE — ED PROVIDER NOTES
Subjective   History of Present Illness  Pt is a 59 yo female presenting to ED with complaints of neck pain and chest pain. PMHx significant for Anxiety, HTN and Bipolar. Pt reports being started on Lamictal about a month ago but discontinued 2 weeks ago after developing difficulty swallowing and possible allergic reaction. She also was on Lisinopril and that was also discontinued. She is able to handle her secretions and drink liquids and food. She reports for the past week having upper back pain that radiates into her chest and also into her abdomen. She has tried steroids and muscle relaxer's without relief. She reports feeling SOB and lightheaded at times. She denies cough, fever, vomiting or diarrhea. She denies extremity swelling or weakness but reports bilateral arm tingling. She has been seen at multiple ED's over the past 2.5 weeks.  4-1-23 took her off Lisinopril, Meloxicam and Lamictal and started her on Amlodipine and Zyrtec. She reports the chest pain is worse with breathing and movement. No reported injury. She denies tobacco, drug or ETOH use.     History provided by:  Medical records and patient      Review of Systems   Constitutional: Negative for chills and fever.   HENT: Positive for trouble swallowing. Negative for congestion and sore throat.    Eyes: Negative for visual disturbance.   Respiratory: Positive for shortness of breath. Negative for cough.    Cardiovascular: Positive for chest pain. Negative for leg swelling.   Gastrointestinal: Positive for abdominal pain and nausea. Negative for constipation, diarrhea and vomiting.   Genitourinary: Negative for difficulty urinating, dysuria, flank pain and hematuria.   Musculoskeletal: Positive for back pain and neck pain. Negative for arthralgias.   Skin: Negative for rash and wound.   Neurological: Positive for light-headedness and numbness. Negative for dizziness, syncope, speech difficulty, weakness and headaches.   Psychiatric/Behavioral:  Negative for confusion.   All other systems reviewed and are negative.      History reviewed. No pertinent past medical history.    Allergies   Allergen Reactions   • Asa [Aspirin] Rash       History reviewed. No pertinent surgical history.    History reviewed. No pertinent family history.    Social History     Socioeconomic History   • Marital status: Single           Objective   Physical Exam  Vitals and nursing note reviewed.   Constitutional:       Appearance: She is well-developed.   HENT:      Head: Atraumatic.      Nose: Nose normal.      Mouth/Throat:      Mouth: Mucous membranes are moist. No angioedema.      Pharynx: Oropharynx is clear. Uvula midline. No pharyngeal swelling or oropharyngeal exudate.   Eyes:      General: Lids are normal.      Conjunctiva/sclera: Conjunctivae normal.      Pupils: Pupils are equal, round, and reactive to light.   Cardiovascular:      Rate and Rhythm: Regular rhythm. Tachycardia present.      Heart sounds: Normal heart sounds.   Pulmonary:      Effort: Pulmonary effort is normal.      Breath sounds: Normal breath sounds. No wheezing.   Abdominal:      General: There is no distension.      Palpations: Abdomen is soft.      Tenderness: There is abdominal tenderness in the right upper quadrant, epigastric area and left upper quadrant. There is no guarding or rebound.   Musculoskeletal:         General: Normal range of motion.      Cervical back: Normal range of motion and neck supple. No tenderness. Normal range of motion.      Thoracic back: Tenderness present. No spasms. Normal range of motion.      Lumbar back: No tenderness. Normal range of motion.   Skin:     General: Skin is warm and dry.      Findings: No erythema or rash.   Neurological:      Mental Status: She is alert and oriented to person, place, and time.      Cranial Nerves: Cranial nerves 2-12 are intact.      Sensory: Sensation is intact. No sensory deficit.      Motor: Motor function is intact.   Psychiatric:          Mood and Affect: Mood is anxious.         Speech: Speech normal.         Behavior: Behavior is hyperactive.         Procedures           ED Course  ED Course as of 04/15/23 1600   Sat Apr 15, 2023   1334 HS Troponin T: <6 [RT]   1334 proBNP: 115.9 [RT]      ED Course User Index  [RT] Fely Cabrera PA      Recent Results (from the past 24 hour(s))   Comprehensive Metabolic Panel    Collection Time: 04/15/23 12:41 PM    Specimen: Blood   Result Value Ref Range    Glucose 90 65 - 99 mg/dL    BUN 8 6 - 20 mg/dL    Creatinine 0.63 0.57 - 1.00 mg/dL    Sodium 140 136 - 145 mmol/L    Potassium 4.4 3.5 - 5.2 mmol/L    Chloride 103 98 - 107 mmol/L    CO2 25.0 22.0 - 29.0 mmol/L    Calcium 9.4 8.6 - 10.5 mg/dL    Total Protein 6.9 6.0 - 8.5 g/dL    Albumin 4.3 3.5 - 5.2 g/dL    ALT (SGPT) 14 1 - 33 U/L    AST (SGOT) 15 1 - 32 U/L    Alkaline Phosphatase 84 39 - 117 U/L    Total Bilirubin 0.2 0.0 - 1.2 mg/dL    Globulin 2.6 gm/dL    A/G Ratio 1.7 g/dL    BUN/Creatinine Ratio 12.7 7.0 - 25.0    Anion Gap 12.0 5.0 - 15.0 mmol/L    eGFR 103.0 >60.0 mL/min/1.73   Lipase    Collection Time: 04/15/23 12:41 PM    Specimen: Blood   Result Value Ref Range    Lipase 16 13 - 60 U/L   BNP    Collection Time: 04/15/23 12:41 PM    Specimen: Blood   Result Value Ref Range    proBNP 115.9 0.0 - 900.0 pg/mL   Single High Sensitivity Troponin T    Collection Time: 04/15/23 12:41 PM    Specimen: Blood   Result Value Ref Range    HS Troponin T <6 <10 ng/L   CBC Auto Differential    Collection Time: 04/15/23 12:41 PM    Specimen: Blood   Result Value Ref Range    WBC 8.83 3.40 - 10.80 10*3/mm3    RBC 4.41 3.77 - 5.28 10*6/mm3    Hemoglobin 14.3 12.0 - 15.9 g/dL    Hematocrit 42.1 34.0 - 46.6 %    MCV 95.5 79.0 - 97.0 fL    MCH 32.4 26.6 - 33.0 pg    MCHC 34.0 31.5 - 35.7 g/dL    RDW 12.0 (L) 12.3 - 15.4 %    RDW-SD 42.1 37.0 - 54.0 fl    MPV 7.9 6.0 - 12.0 fL    Platelets 398 140 - 450 10*3/mm3    Neutrophil % 58.5 42.7 - 76.0 %     Lymphocyte % 35.3 19.6 - 45.3 %    Monocyte % 4.1 (L) 5.0 - 12.0 %    Eosinophil % 1.5 0.3 - 6.2 %    Basophil % 0.3 0.0 - 1.5 %    Immature Grans % 0.3 0.0 - 0.5 %    Neutrophils, Absolute 5.16 1.70 - 7.00 10*3/mm3    Lymphocytes, Absolute 3.12 (H) 0.70 - 3.10 10*3/mm3    Monocytes, Absolute 0.36 0.10 - 0.90 10*3/mm3    Eosinophils, Absolute 0.13 0.00 - 0.40 10*3/mm3    Basophils, Absolute 0.03 0.00 - 0.20 10*3/mm3    Immature Grans, Absolute 0.03 0.00 - 0.05 10*3/mm3    nRBC 0.0 0.0 - 0.2 /100 WBC   Green Top (Gel)    Collection Time: 04/15/23 12:41 PM   Result Value Ref Range    Extra Tube Hold for add-ons.    Lavender Top    Collection Time: 04/15/23 12:41 PM   Result Value Ref Range    Extra Tube hold for add-on    Gold Top - SST    Collection Time: 04/15/23 12:41 PM   Result Value Ref Range    Extra Tube Hold for add-ons.    Light Blue Top    Collection Time: 04/15/23 12:41 PM   Result Value Ref Range    Extra Tube Hold for add-ons.    Urinalysis With Microscopic If Indicated (No Culture) - Urine, Clean Catch    Collection Time: 04/15/23  2:24 PM    Specimen: Urine, Clean Catch   Result Value Ref Range    Color, UA Yellow Yellow, Straw    Appearance, UA Clear Clear    pH, UA 8.0 5.0 - 8.0    Specific Gravity, UA 1.018 1.001 - 1.030    Glucose, UA Negative Negative    Ketones, UA Negative Negative    Bilirubin, UA Negative Negative    Blood, UA Negative Negative    Protein, UA Negative Negative    Leuk Esterase, UA Trace (A) Negative    Nitrite, UA Negative Negative    Urobilinogen, UA 0.2 E.U./dL 0.2 - 1.0 E.U./dL   Urinalysis, Microscopic Only - Urine, Clean Catch    Collection Time: 04/15/23  2:24 PM    Specimen: Urine, Clean Catch   Result Value Ref Range    RBC, UA 0-2 None Seen, 0-2 /HPF    WBC, UA 0-2 None Seen, 0-2 /HPF    Bacteria, UA None Seen None Seen, Trace /HPF    Squamous Epithelial Cells, UA 0-2 None Seen, 0-2 /HPF    Hyaline Casts, UA None Seen 0 - 6 /LPF    Methodology Automated Microscopy   "    Note: In addition to lab results from this visit, the labs listed above may include labs taken at another facility or during a different encounter within the last 24 hours. Please correlate lab times with ED admission and discharge times for further clarification of the services performed during this visit.    CT Angiogram Chest   Final Result   Impression:   No evidence of acute abnormality in the chest, abdomen or pelvis.      Hepatic steatosis.      Electronically Signed: Fausto Crenshaw     4/15/2023 2:41 PM EDT     Workstation ID: [id number]      CT Abdomen Pelvis With Contrast   Final Result   Impression:   No evidence of acute abnormality in the chest, abdomen or pelvis.      Hepatic steatosis.      Electronically Signed: Fausto Crenshaw     4/15/2023 2:41 PM EDT     Workstation ID: [id number]        Vitals:    04/15/23 1133 04/15/23 1537   BP: 177/94 (!) 173/101   BP Location: Left arm Right arm   Patient Position: Sitting Sitting   Pulse: 110 98   Resp: 22 20   Temp: 98.6 °F (37 °C)    TempSrc: Oral    SpO2: 99% 100%   Weight: 53.1 kg (117 lb)    Height: 157.5 cm (62\")      Medications   sodium chloride 0.9 % flush 10 mL (has no administration in time range)   sodium chloride 0.9 % bolus 1,000 mL (0 mL Intravenous Stopped 4/15/23 1537)   methylPREDNISolone sodium succinate (SOLU-Medrol) injection 80 mg (80 mg Intravenous Given 4/15/23 1242)   ketorolac (TORADOL) injection 15 mg (15 mg Intravenous Given 4/15/23 1242)   hydrOXYzine (ATARAX) tablet 25 mg (25 mg Oral Given 4/15/23 1241)   iopamidol (ISOVUE-370) 76 % injection 100 mL (85 mL Intravenous Given 4/15/23 1349)     ECG/EMG Results (last 24 hours)     ** No results found for the last 24 hours. **        ECG 12 Lead Chest Pain    (Results Pending)                                            Medical Decision Making  Pt is a 57 yo female presenting to ED with multiple complaints. Patient has been seen by multiple ED for similar symptoms. She reports " worsening CP radiating into back and abdomen. CTA chest and CT abd / pelvis without emergent findings. Labs WNL including Trop and BNP. Patient more relaxed after meds in ED. Used translating service multiple times. Will dc home on Atarax and Pepcid. Discussed f/u with PCP, GI and Psych. Patient agreeable with plan.     DDx  ACS, CHF, Dissection, PE, Pancreatitis, PUD, Cholecystitis, Anxiety, Esophageal stricture     Chest pain, unspecified type: acute illness or injury  Dysphagia, unspecified type: acute illness or injury  History of anxiety: chronic illness or injury  Pain of upper abdomen: acute illness or injury  Amount and/or Complexity of Data Reviewed  External Data Reviewed: notes.     Details: ED from multiple facilities, PCP  Labs: ordered. Decision-making details documented in ED Course.  Radiology: ordered. Decision-making details documented in ED Course.  ECG/medicine tests: ordered. Decision-making details documented in ED Course.      Risk  Prescription drug management.          Final diagnoses:   Chest pain, unspecified type   Pain of upper abdomen   Dysphagia, unspecified type   History of anxiety       ED Disposition  ED Disposition     ED Disposition   Discharge    Condition   Stable    Comment   --             Mervat Chang MD  [address] [id number] [phone]    Schedule an appointment as soon as possible for a visit       [facility]  [address] [facility] [address] [phone]    If symptoms worsen    Calos Bauer MD  [address] [facility]  [address]  [facility] [id number] [phone]               Medication List      New Prescriptions    famotidine 40 MG tablet  Commonly known as: PEPCID  Take 1 tablet by mouth Daily for 30 days.     hydrOXYzine 25 MG tablet  Commonly known as: ATARAX  Take 1 tablet by mouth 3 (Three) Times a Day As Needed for Itching for up to 12 doses.        Stop     methylPREDNISolone 4 MG dose pack  Commonly known as: MEDROL     nitrofurantoin (macrocrystal-monohydrate) 100 MG capsule  Commonly known as: MACROBID           Where to Get Your Medications      These medications were sent to [id number] - [place], [place] - [address] AT [facility] [address] - [phone]  - [phone] FX  [address], [facility] [address]    Phone: [phone]   · famotidine 40 MG tablet  · hydrOXYzine 25 MG tablet          Fely Cabrera PA  04/15/23 1607

## 2023-04-24 ENCOUNTER — OFFICE VISIT (OUTPATIENT)
Dept: ORTHOPEDIC SURGERY | Facility: CLINIC | Age: 58
End: 2023-04-24
Payer: MEDICARE

## 2023-04-24 VITALS
DIASTOLIC BLOOD PRESSURE: 82 MMHG | BODY MASS INDEX: 21.54 KG/M2 | HEIGHT: 62 IN | WEIGHT: 117.06 LBS | SYSTOLIC BLOOD PRESSURE: 146 MMHG

## 2023-04-24 DIAGNOSIS — M25.511 BILATERAL SHOULDER PAIN, UNSPECIFIED CHRONICITY: Primary | ICD-10-CM

## 2023-04-24 DIAGNOSIS — M75.91 SUPRASPINATUS TENDINITIS, RIGHT: ICD-10-CM

## 2023-04-24 DIAGNOSIS — M75.92 SUPRASPINATUS TENDINITIS, LEFT: ICD-10-CM

## 2023-04-24 DIAGNOSIS — M25.512 BILATERAL SHOULDER PAIN, UNSPECIFIED CHRONICITY: Primary | ICD-10-CM

## 2023-04-24 RX ORDER — TRIAMCINOLONE ACETONIDE 40 MG/ML
40 INJECTION, SUSPENSION INTRA-ARTICULAR; INTRAMUSCULAR
Status: COMPLETED | OUTPATIENT
Start: 2023-04-24 | End: 2023-04-24

## 2023-04-24 RX ORDER — MIRTAZAPINE 15 MG/1
15 TABLET, FILM COATED ORAL NIGHTLY
COMMUNITY
Start: 2023-04-08

## 2023-04-24 RX ORDER — DULOXETIN HYDROCHLORIDE 60 MG/1
1 CAPSULE, DELAYED RELEASE ORAL DAILY
COMMUNITY
Start: 2023-04-08

## 2023-04-24 RX ORDER — HYDROXYZINE PAMOATE 25 MG/1
CAPSULE ORAL
COMMUNITY
Start: 2023-04-17

## 2023-04-24 RX ORDER — LIDOCAINE HYDROCHLORIDE 10 MG/ML
4 INJECTION, SOLUTION EPIDURAL; INFILTRATION; INTRACAUDAL; PERINEURAL
Status: COMPLETED | OUTPATIENT
Start: 2023-04-24 | End: 2023-04-24

## 2023-04-24 RX ORDER — CETIRIZINE HYDROCHLORIDE 10 MG/1
10 TABLET ORAL DAILY
COMMUNITY
Start: 2023-04-01

## 2023-04-24 RX ORDER — BUPIVACAINE HYDROCHLORIDE 2.5 MG/ML
4 INJECTION, SOLUTION EPIDURAL; INFILTRATION; INTRACAUDAL
Status: COMPLETED | OUTPATIENT
Start: 2023-04-24 | End: 2023-04-24

## 2023-04-24 RX ADMIN — LIDOCAINE HYDROCHLORIDE 4 ML: 10 INJECTION, SOLUTION EPIDURAL; INFILTRATION; INTRACAUDAL; PERINEURAL at 10:55

## 2023-04-24 RX ADMIN — TRIAMCINOLONE ACETONIDE 40 MG: 40 INJECTION, SUSPENSION INTRA-ARTICULAR; INTRAMUSCULAR at 10:55

## 2023-04-24 RX ADMIN — BUPIVACAINE HYDROCHLORIDE 4 ML: 2.5 INJECTION, SOLUTION EPIDURAL; INFILTRATION; INTRACAUDAL at 10:55

## 2023-04-24 NOTE — PROGRESS NOTES
[facility] Note        Subjective     Pain of the Right Shoulder and Pain of the Left Shoulder      HPI    Sarah Beth Patel is a 58 y.o. female.She has bilateral shoulder pain.  She has had pain for the past month.  10 out of 10.  She has been the emergency department about 4 times in the past 5 or 6 weeks for different reasons.  She recently saw a doctor about her neck pain.  She has an MRI ordered of her cervical spine.  She had right shoulder surgery in 2017 left shoulder surgery 2014.  No recent trauma.  She is on disability for bipolar disease.  She has been lifting a lot of things and think she thinks that may have done it.  She has a  online today.    Past Medical History:   Diagnosis Date   • Acromioclavicular separation    • Dislocated elbow    • Dislocation, shoulder    • Frozen shoulder 1/04/2023   • Neck strain    • Rotator cuff syndrome    • Subluxation of patella    • Tear of meniscus of knee    • Tendinitis of knee       Past Surgical History:   Procedure Laterality Date   • EYE SURGERY     • HYSTERECTOMY     • KNEE SURGERY Left    • SHOULDER SURGERY      Right shoulder surgery 2017 left shoulder surgery 2014   • VAGINAL BIOPSY        Family History   Problem Relation Age of Onset   • Dislocations Paternal Grandmother         Dislocación de hombro jered   • Cancer Maternal Aunt         Cancer de Matriz     Social History     Socioeconomic History   • Marital status: Single   Tobacco Use   • Smoking status: Every Day     Packs/day: 1.50     Years: 10.00     Pack years: 15.00     Types: Cigarettes     Start date: 4/22/2023     Last attempt to quit: 4/23/2023   Substance and Sexual Activity   • Alcohol use: Not Currently   • Drug use: Never   • Sexual activity: Not Currently     Partners: Female      Current Outpatient Medications on File Prior to Visit   Medication Sig Dispense Refill   • cetirizine (zyrTEC) 10 MG tablet Take 1 tablet by mouth  "Daily.     • DULoxetine (CYMBALTA) 60 MG capsule Take 1 capsule by mouth Daily.     • hydrOXYzine pamoate (VISTARIL) 25 MG capsule      • mirtazapine (REMERON) 15 MG tablet Take 1 tablet by mouth Every Night.     • [DISCONTINUED] famotidine (PEPCID) 40 MG tablet Take 1 tablet by mouth Daily for 30 days. 30 tablet 0   • [DISCONTINUED] hydrOXYzine (ATARAX) 25 MG tablet Take 1 tablet by mouth 3 (Three) Times a Day As Needed for Itching for up to 12 doses. 12 tablet 0     No current facility-administered medications on file prior to visit.      Allergies   Allergen Reactions   • Iodine Swelling     Facial swelling   • Asa [Aspirin] Rash          Review of Systems   Constitutional: Negative.    HENT: Negative.    Eyes: Negative.    Respiratory: Negative.    Cardiovascular: Negative.    Gastrointestinal: Negative.    Endocrine: Negative.    Genitourinary: Negative.    Musculoskeletal: Positive for arthralgias.   Skin: Negative.    Allergic/Immunologic: Negative.    Neurological: Negative.    Hematological: Negative.    Psychiatric/Behavioral: Negative.         I reviewed the patient's chief complaint, history of present illness, review of systems, past medical history, surgical history, family history, social history, medications and allergy list.        Objective      Physical Exam  /82   Ht 157.5 cm (62.01\")   Wt 53.1 kg (117 lb 1 oz)   BMI 21.41 kg/m²     Body mass index is 21.41 kg/m².    General  Mental Status - alert  General Appearance - cooperative, well groomed, not in acute distress  Orientation - Oriented X3  Build & Nutrition - well developed and well nourished  Posture - normal posture  Gait - normal gait       Ortho Exam  Bilateral shoulders with impingement.  Weakness of the supraspinatus.  Painful range of motion.  Forward flexion abduction about 140 degrees.    Imaging/Studies  Imaging Results (Last 24 Hours)     Procedure Component Value Units Date/Time    XR Shoulder 2+ View Bilateral " [[id number]] Resulted: 04/24/23 1045     Updated: 04/24/23 1046    Narrative:      Bilateral shoulder X-Rays  Indication: Pain  AP, scapular Y, and axillary lateral views    Findings: Evidence of prior surgery on both shoulders distal clavicle   resections  No fracture    No prior studies were available for comparison.          I viewed arthroscopic images of the right shoulder from 2017 which show evidence of rotator cuff repair.  I reviewed previous MRIs from 2017 and 2020 with previous rotator cuff tears    Assessment    Assessment:  1. Bilateral shoulder pain, unspecified chronicity    2. Supraspinatus tendinitis, right    3. Supraspinatus tendinitis, left        Plan:  1. Continue over-the-counter medication as needed for discomfort  2. I injected the right shoulder with cortisone in the subacromial space.  I discussed with the patient the potential benefits of performing a therapeutic injection of the cortisone as well as potential risks including but not limited to infection, swelling, pain, bleeding, bruising, nerve/vessel damage, skin color changes, transient elevation in blood glucose levels, and fat atrophy. After informed consent and verifying correct patient, procedure site, and type of procedure, the area was prepped with Hibiclens, ethyl chloride was used to numb the skin. The patient tolerated the procedure well. There were no complications.  3. She will follow-up next week for possible left shoulder injection.        Vaibhav Nunes MD  04/24/23  11:11 EDT      Dictated Utilizing Dragon Dictation.

## 2023-04-24 NOTE — PROGRESS NOTES
Subjective:     Patient ID: Sarah Beth Patel is a 58 y.o. female.    Chief Complaint:    History of Present Illness  She has bilateral shoulder pain.  She has had pain for the past month.  10 out of 10.  She has been the emergency department about 4 times in the past 5 or 6 weeks for different reasons.  She recently saw a doctor about her neck pain.  She has an MRI ordered of her cervical spine.  She had right shoulder surgery in 2017 left shoulder surgery 2014.  No recent trauma.  She is on disability for bipolar disease.  She has been lifting a lot of things and think she thinks that may have done it.  She has a  online today.     Social History     Occupational History   • Not on file   Tobacco Use   • Smoking status: Every Day     Packs/day: 1.50     Years: 10.00     Pack years: 15.00     Types: Cigarettes     Start date: 4/22/2023     Last attempt to quit: 4/23/2023   • Smokeless tobacco: Not on file   Substance and Sexual Activity   • Alcohol use: Not Currently   • Drug use: Never   • Sexual activity: Not Currently     Partners: Female      Review of Systems    Otherwise negative    Objective:     Ortho Exam      Assessment:       1. Bilateral shoulder pain, unspecified chronicity          Plan:        ***

## 2023-04-24 NOTE — PROGRESS NOTES
Procedure   Large Joint Arthrocentesis: R subacromial bursa  Date/Time: 4/24/2023 10:55 AM  Consent given by: patient  Site marked: site marked  Timeout: Immediately prior to procedure a time out was called to verify the correct patient, procedure, equipment, support staff and site/side marked as required   Supporting Documentation  Indications: pain   Procedure Details  Location: shoulder - R subacromial bursa  Preparation: Patient was prepped and draped in the usual sterile fashion  Needle size: 22 G  Approach: posterior  Medications administered: 4 mL bupivacaine (PF) 0.25 %; 4 mL lidocaine PF 1% 1 %; 40 mg triamcinolone acetonide 40 MG/ML  Patient tolerance: patient tolerated the procedure well with no immediate complications

## 2023-04-27 ENCOUNTER — HOSPITAL ENCOUNTER (EMERGENCY)
Facility: HOSPITAL | Age: 58
Discharge: HOME | End: 2023-04-27
Attending: EMERGENCY MEDICINE
Payer: MEDICARE

## 2023-04-27 ENCOUNTER — APPOINTMENT (OUTPATIENT)
Dept: CT IMAGING | Facility: HOSPITAL | Age: 58
End: 2023-04-27
Payer: MEDICARE

## 2023-04-27 VITALS
TEMPERATURE: 98.6 F | OXYGEN SATURATION: 97 % | SYSTOLIC BLOOD PRESSURE: 162 MMHG | HEIGHT: 62 IN | WEIGHT: 120 LBS | DIASTOLIC BLOOD PRESSURE: 96 MMHG | RESPIRATION RATE: 24 BRPM | BODY MASS INDEX: 22.08 KG/M2 | HEART RATE: 99 BPM

## 2023-04-27 DIAGNOSIS — K59.00 CONSTIPATION, UNSPECIFIED CONSTIPATION TYPE: ICD-10-CM

## 2023-04-27 DIAGNOSIS — R10.9 ACUTE ABDOMINAL PAIN: Primary | ICD-10-CM

## 2023-04-27 LAB
ALBUMIN SERPL-MCNC: 4.3 G/DL (ref 3.5–5.2)
ALBUMIN/GLOB SERPL: 1.4 G/DL
ALP SERPL-CCNC: 99 U/L (ref 39–117)
ALT SERPL W P-5'-P-CCNC: 11 U/L (ref 1–33)
ANION GAP SERPL CALCULATED.3IONS-SCNC: 12 MMOL/L (ref 5–15)
AST SERPL-CCNC: 13 U/L (ref 1–32)
BACTERIA UR QL AUTO: NORMAL /HPF
BASOPHILS # BLD AUTO: 0.04 10*3/MM3 (ref 0–0.2)
BASOPHILS NFR BLD AUTO: 0.3 % (ref 0–1.5)
BILIRUB SERPL-MCNC: <0.2 MG/DL (ref 0–1.2)
BILIRUB UR QL STRIP: NEGATIVE
BUN SERPL-MCNC: 19 MG/DL (ref 6–20)
BUN/CREAT SERPL: 27.9 (ref 7–25)
CALCIUM SPEC-SCNC: 9.3 MG/DL (ref 8.6–10.5)
CHLORIDE SERPL-SCNC: 108 MMOL/L (ref 98–107)
CLARITY UR: CLEAR
CO2 SERPL-SCNC: 24 MMOL/L (ref 22–29)
COLOR UR: YELLOW
CREAT SERPL-MCNC: 0.68 MG/DL (ref 0.57–1)
D-LACTATE SERPL-SCNC: 1.1 MMOL/L (ref 0.5–2)
DEPRECATED RDW RBC AUTO: 47.7 FL (ref 37–54)
EGFRCR SERPLBLD CKD-EPI 2021: 101.1 ML/MIN/1.73
EOSINOPHIL # BLD AUTO: 0.07 10*3/MM3 (ref 0–0.4)
EOSINOPHIL NFR BLD AUTO: 0.6 % (ref 0.3–6.2)
ERYTHROCYTE [DISTWIDTH] IN BLOOD BY AUTOMATED COUNT: 12.7 % (ref 12.3–15.4)
GLOBULIN UR ELPH-MCNC: 3.1 GM/DL
GLUCOSE SERPL-MCNC: 113 MG/DL (ref 65–99)
GLUCOSE UR STRIP-MCNC: NEGATIVE MG/DL
HCT VFR BLD AUTO: 36.6 % (ref 34–46.6)
HGB BLD-MCNC: 11.5 G/DL (ref 12–15.9)
HGB UR QL STRIP.AUTO: ABNORMAL
HOLD SPECIMEN: NORMAL
HYALINE CASTS UR QL AUTO: NORMAL /LPF
IMM GRANULOCYTES # BLD AUTO: 0.06 10*3/MM3 (ref 0–0.05)
IMM GRANULOCYTES NFR BLD AUTO: 0.5 % (ref 0–0.5)
KETONES UR QL STRIP: NEGATIVE
LEUKOCYTE ESTERASE UR QL STRIP.AUTO: NEGATIVE
LIPASE SERPL-CCNC: 28 U/L (ref 13–60)
LYMPHOCYTES # BLD AUTO: 3.63 10*3/MM3 (ref 0.7–3.1)
LYMPHOCYTES NFR BLD AUTO: 31.2 % (ref 19.6–45.3)
MCH RBC QN AUTO: 32.2 PG (ref 26.6–33)
MCHC RBC AUTO-ENTMCNC: 31.4 G/DL (ref 31.5–35.7)
MCV RBC AUTO: 102.5 FL (ref 79–97)
MONOCYTES # BLD AUTO: 0.57 10*3/MM3 (ref 0.1–0.9)
MONOCYTES NFR BLD AUTO: 4.9 % (ref 5–12)
NEUTROPHILS NFR BLD AUTO: 62.5 % (ref 42.7–76)
NEUTROPHILS NFR BLD AUTO: 7.28 10*3/MM3 (ref 1.7–7)
NITRITE UR QL STRIP: NEGATIVE
NRBC BLD AUTO-RTO: 0 /100 WBC (ref 0–0.2)
PH UR STRIP.AUTO: 5.5 [PH] (ref 5–8)
PLATELET # BLD AUTO: 310 10*3/MM3 (ref 140–450)
PMV BLD AUTO: 7.6 FL (ref 6–12)
POTASSIUM SERPL-SCNC: 3.9 MMOL/L (ref 3.5–5.2)
PROT SERPL-MCNC: 7.4 G/DL (ref 6–8.5)
PROT UR QL STRIP: NEGATIVE
RBC # BLD AUTO: 3.57 10*6/MM3 (ref 3.77–5.28)
RBC # UR STRIP: NORMAL /HPF
REF LAB TEST METHOD: NORMAL
SODIUM SERPL-SCNC: 144 MMOL/L (ref 136–145)
SP GR UR STRIP: 1.01 (ref 1–1.03)
SQUAMOUS #/AREA URNS HPF: NORMAL /HPF
UROBILINOGEN UR QL STRIP: ABNORMAL
WBC # UR STRIP: NORMAL /HPF
WBC NRBC COR # BLD: 11.65 10*3/MM3 (ref 3.4–10.8)
WHOLE BLOOD HOLD COAG: NORMAL
WHOLE BLOOD HOLD SPECIMEN: NORMAL

## 2023-04-27 PROCEDURE — 85025 COMPLETE CBC W/AUTO DIFF WBC: CPT

## 2023-04-27 PROCEDURE — 83605 ASSAY OF LACTIC ACID: CPT

## 2023-04-27 PROCEDURE — 81001 URINALYSIS AUTO W/SCOPE: CPT | Performed by: EMERGENCY MEDICINE

## 2023-04-27 PROCEDURE — 80053 COMPREHEN METABOLIC PANEL: CPT

## 2023-04-27 PROCEDURE — 83690 ASSAY OF LIPASE: CPT

## 2023-04-27 PROCEDURE — 74176 CT ABD & PELVIS W/O CONTRAST: CPT

## 2023-04-27 PROCEDURE — 99283 EMERGENCY DEPT VISIT LOW MDM: CPT

## 2023-04-27 PROCEDURE — 36415 COLL VENOUS BLD VENIPUNCTURE: CPT

## 2023-04-27 RX ORDER — SODIUM CHLORIDE 9 MG/ML
10 INJECTION INTRAVENOUS AS NEEDED
Status: DISCONTINUED | OUTPATIENT
Start: 2023-04-27 | End: 2023-04-27 | Stop reason: HOSPADM

## 2023-04-27 NOTE — ED PROVIDER NOTES
Subjective   History of Present Illness  58-year-old female who presents with complaint of abdominal pain.  She reports that last night she began to have right-sided abdominal pain.  She describes pain throughout the anterior abdomen and into the right flank.  The abdominal pain began last night and has persisted into tOday.  She reports a history of abdominal pain in the past.  She feels her abdomen is more distended.  She does report decreased bowel frequency over the last few days.  She reports nausea and decreased oral intake.  No fever or infectious symptoms.  No chest pain, cough, shortness of breath.  No other acute complaints.  She did not take medication prior to arrival.          Review of Systems   Constitutional: Positive for appetite change. Negative for chills, fatigue and fever.   HENT: Negative for congestion, ear pain, postnasal drip, sinus pressure and sore throat.    Eyes: Negative for pain, redness and visual disturbance.   Respiratory: Negative for cough, chest tightness and shortness of breath.    Cardiovascular: Negative for chest pain, palpitations and leg swelling.   Gastrointestinal: Positive for abdominal pain and constipation. Negative for anal bleeding, blood in stool, diarrhea, nausea and vomiting.   Endocrine: Negative for polydipsia and polyuria.   Genitourinary: Negative for difficulty urinating, dysuria, frequency and urgency.   Musculoskeletal: Negative for arthralgias, back pain and neck pain.   Skin: Negative for pallor and rash.   Allergic/Immunologic: Negative for environmental allergies and immunocompromised state.   Neurological: Negative for dizziness, weakness and headaches.   Hematological: Negative for adenopathy.   Psychiatric/Behavioral: Negative for confusion, self-injury and suicidal ideas. The patient is not nervous/anxious.    All other systems reviewed and are negative.      Past Medical History:   Diagnosis Date   • Acromioclavicular separation    • Dislocated  elbow    • Dislocation, shoulder    • Frozen shoulder 2023   • Neck strain    • Rotator cuff syndrome    • Subluxation of patella    • Tear of meniscus of knee    • Tendinitis of knee        Allergies   Allergen Reactions   • Iodine Swelling     Facial swelling   • Asa [Aspirin] Rash       Past Surgical History:   Procedure Laterality Date   • EYE SURGERY     • HYSTERECTOMY     • KNEE SURGERY Left    • SHOULDER SURGERY      Right shoulder surgery 2017 left shoulder surgery 2014   • VAGINAL BIOPSY         Family History   Problem Relation Age of Onset   • Dislocations Paternal Grandmother         Dislocación de hombro jered   • Cancer Maternal Aunt         Cancer de Matriz       Social History     Socioeconomic History   • Marital status: Single   Tobacco Use   • Smoking status: Every Day     Packs/day: 1.50     Years: 10.00     Pack years: 15.00     Types: Cigarettes     Start date: 2023     Last attempt to quit: 2023     Years since quittin.0   Substance and Sexual Activity   • Alcohol use: Not Currently   • Drug use: Never   • Sexual activity: Not Currently     Partners: Female           Objective   Physical Exam  Vitals and nursing note reviewed.   Constitutional:       General: She is not in acute distress.     Appearance: Normal appearance. She is well-developed. She is not toxic-appearing or diaphoretic.   HENT:      Head: Normocephalic and atraumatic.      Right Ear: External ear normal.      Left Ear: External ear normal.      Nose: Nose normal.   Eyes:      General: Lids are normal.      Pupils: Pupils are equal, round, and reactive to light.   Neck:      Trachea: No tracheal deviation.   Cardiovascular:      Rate and Rhythm: Normal rate and regular rhythm.      Pulses: No decreased pulses.      Heart sounds: Normal heart sounds. No murmur heard.    No friction rub. No gallop.   Pulmonary:      Effort: Pulmonary effort is normal. No respiratory distress.      Breath sounds: Normal  breath sounds. No decreased breath sounds, wheezing, rhonchi or rales.   Abdominal:      General: Bowel sounds are normal.      Palpations: Abdomen is soft.      Tenderness: There is no abdominal tenderness. There is no guarding or rebound.       Musculoskeletal:         General: No deformity. Normal range of motion.      Cervical back: Normal range of motion and neck supple.   Lymphadenopathy:      Cervical: No cervical adenopathy.   Skin:     General: Skin is warm and dry.      Findings: No rash.   Neurological:      Mental Status: She is alert and oriented to person, place, and time.      Cranial Nerves: No cranial nerve deficit.      Sensory: No sensory deficit.   Psychiatric:         Speech: Speech normal.         Behavior: Behavior normal.         Thought Content: Thought content normal.         Judgment: Judgment normal.         Procedures           ED Course                                   SKIP reviewed by Helder Meneses MD       Medical Decision Making  58-year-old female presents with abdominal pain.    Differential includes kidney stone, urinary tract infection, diverticulitis, constipation, appendicitis.    CT scan of the abdomen pelvis shows no acute abnormalities.  Stool is observed on my evaluation of the CT scan in the right abdomen and may account for the patient's current symptoms.  She does report decreased bowel function.    No significant lab abnormalities.  White blood cell count is mildly elevated.  Urine does not show infection.    The patient will be discharged with a course of GoLytely.  Advised to rest, drink plenty of fluid, and engage in regular activity.    Acute abdominal pain: complicated acute illness or injury  Constipation, unspecified constipation type: complicated acute illness or injury  Amount and/or Complexity of Data Reviewed  External Data Reviewed: labs, radiology and notes.  Labs: ordered.  Radiology: ordered.      Risk  Prescription drug  management.          Final diagnoses:   Acute abdominal pain   Constipation, unspecified constipation type       ED Disposition  ED Disposition     ED Disposition   Discharge    Condition   Stable    Comment   --             Mervat Chang MD  [address] [place] [id number] [phone]    In 1 week           Medication List      New Prescriptions    polyethylene glycol 236 g solution  Commonly known as: GoLYTELY  Take 4,000 mL by mouth 1 (One) Time for 1 dose.           Where to Get Your Medications      These medications were sent to [id number] - [place], [place] - [address] [facility] [address] - [phone]  - [phone]  [address] [address]    Phone: [phone]   · polyethylene glycol 236 g solution          Helder Meneses MD  04/27/23 [id number]

## 2023-04-27 NOTE — Clinical Note
[facility] EMERGENCY DEPARTMENT  [address] [address]  Phone: [phone]    Sarah Beth Patel was seen and treated in our emergency department on 4/27/2023.  She may return to work on 04/30/2023.         Thank you for choosing [facility].    Helder Meneses MD

## 2023-05-01 ENCOUNTER — OFFICE VISIT (OUTPATIENT)
Dept: ORTHOPEDIC SURGERY | Facility: CLINIC | Age: 58
End: 2023-05-01
Payer: MEDICARE

## 2023-05-01 VITALS
DIASTOLIC BLOOD PRESSURE: 90 MMHG | WEIGHT: 120 LBS | HEIGHT: 62 IN | SYSTOLIC BLOOD PRESSURE: 140 MMHG | BODY MASS INDEX: 22.08 KG/M2

## 2023-05-01 DIAGNOSIS — M25.512 BILATERAL SHOULDER PAIN, UNSPECIFIED CHRONICITY: ICD-10-CM

## 2023-05-01 DIAGNOSIS — M25.511 BILATERAL SHOULDER PAIN, UNSPECIFIED CHRONICITY: ICD-10-CM

## 2023-05-01 DIAGNOSIS — M75.92 SUPRASPINATUS TENDINITIS, LEFT: Primary | ICD-10-CM

## 2023-05-01 DIAGNOSIS — M75.91 SUPRASPINATUS TENDINITIS, RIGHT: ICD-10-CM

## 2023-05-01 RX ORDER — TRIAMCINOLONE ACETONIDE 40 MG/ML
40 INJECTION, SUSPENSION INTRA-ARTICULAR; INTRAMUSCULAR
Status: COMPLETED | OUTPATIENT
Start: 2023-05-01 | End: 2023-05-01

## 2023-05-01 RX ORDER — LIDOCAINE HYDROCHLORIDE 10 MG/ML
4 INJECTION, SOLUTION EPIDURAL; INFILTRATION; INTRACAUDAL; PERINEURAL
Status: COMPLETED | OUTPATIENT
Start: 2023-05-01 | End: 2023-05-01

## 2023-05-01 RX ORDER — BUPIVACAINE HYDROCHLORIDE 2.5 MG/ML
4 INJECTION, SOLUTION EPIDURAL; INFILTRATION; INTRACAUDAL
Status: COMPLETED | OUTPATIENT
Start: 2023-05-01 | End: 2023-05-01

## 2023-05-01 RX ORDER — POLYETHYLENE GLYCOL-3350 AND ELECTROLYTES 236; 6.74; 5.86; 2.97; 22.74 G/274.31G; G/274.31G; G/274.31G; G/274.31G; G/274.31G
POWDER, FOR SOLUTION ORAL
COMMUNITY
Start: 2023-04-28 | End: 2023-05-03

## 2023-05-01 RX ADMIN — BUPIVACAINE HYDROCHLORIDE 4 ML: 2.5 INJECTION, SOLUTION EPIDURAL; INFILTRATION; INTRACAUDAL at 10:15

## 2023-05-01 RX ADMIN — LIDOCAINE HYDROCHLORIDE 4 ML: 10 INJECTION, SOLUTION EPIDURAL; INFILTRATION; INTRACAUDAL; PERINEURAL at 10:15

## 2023-05-01 RX ADMIN — TRIAMCINOLONE ACETONIDE 40 MG: 40 INJECTION, SUSPENSION INTRA-ARTICULAR; INTRAMUSCULAR at 10:15

## 2023-05-01 NOTE — PROGRESS NOTES
"    [facility] [place]        Subjective     CC: Follow-up (1 week follow up - Bilateral shoulder pain)      MONTRELL Patel is a 58 y.o. female.  Right shoulder injection helped 2 days.  Then it wore off.She has bilateral shoulder pain.  She has had pain for the past month.  10 out of 10.  She has been the emergency department about 4 times in the past 5 or 6 weeks for different reasons.  She recently saw a doctor about her neck pain.  She has an MRI ordered of her cervical spine.  She had right shoulder surgery in 2017 left shoulder surgery .  No recent trauma.  She is on disability for bipolar disease.  She has been lifting a lot of things and think she thinks that may have done it.  She has a  online today.  Overall, patient's symptoms are worse in the left shoulder.    ROS:    Constiutional:Pt denies fever, chills, nausea, or vomiting.  MSK:as above        Objective      Past Medical History  Past Medical History:   Diagnosis Date   • Acromioclavicular separation    • Dislocated elbow    • Dislocation, shoulder    • Frozen shoulder 2023   • Neck strain    • Rotator cuff syndrome    • Subluxation of patella    • Tear of meniscus of knee    • Tendinitis of knee      Social History     Socioeconomic History   • Marital status: Single   Tobacco Use   • Smoking status: Every Day     Packs/day: 1.50     Years: 10.00     Pack years: 15.00     Types: Cigarettes     Start date: 2023     Last attempt to quit: 2023     Years since quittin.0   Substance and Sexual Activity   • Alcohol use: Not Currently   • Drug use: Never   • Sexual activity: Not Currently     Partners: Female          Physical Exam  /90   Ht 157.5 cm (62.01\")   Wt 54.4 kg (120 lb)   BMI 21.94 kg/m²     Body mass index is 21.94 kg/m².    Patient is well nourished and well developed.        Ortho Exam  No change in bilateral shoulder exam.    Imaging/Labs/EMG " Reviewed:  Imaging Results (Last 24 Hours)     ** No results found for the last 24 hours. **            Assessment    Assessment:  1. Supraspinatus tendinitis, left    2. Supraspinatus tendinitis, right    3. Bilateral shoulder pain, unspecified chronicity        Plan:  1. Recommend over the counter anti-inflammatories for pain and/or swelling  2. I injected the left shoulder with cortisone.  I discussed with the patient the potential benefits of performing a therapeutic injection of the cortisone as well as potential risks including but not limited to infection, swelling, pain, bleeding, bruising, nerve/vessel damage, skin color changes, transient elevation in blood glucose levels, and fat atrophy. After informed consent and verifying correct patient, procedure site, and type of procedure, the area was prepped with Hibiclens, ethyl chloride was used to numb the skin. The patient tolerated the procedure well. There were no complications.  She tolerated the injection well.  I injected the subacromial space.   3.  I ordered physical therapy for the supraspinatus tendinitis and pain in both shoulders.      Vaibhav Nunes MD  05/01/23  10:31 EDT      Dictated Utilizing Dragon Dictation.

## 2023-05-01 NOTE — PROGRESS NOTES
Procedure   - Large Joint Arthrocentesis: L subacromial bursa on 5/1/2023 10:15 AM  Indications: pain  Details: 21 G needle, posterior approach  Medications: 4 mL lidocaine PF 1% 1 %; 4 mL bupivacaine (PF) 0.25 %; 40 mg triamcinolone acetonide 40 MG/ML  Outcome: tolerated well, no immediate complications  Procedure, treatment alternatives, risks and benefits explained, specific risks discussed. Consent was given by the patient. Immediately prior to procedure a time out was called to verify the correct patient, procedure, equipment, support staff and site/side marked as required. Patient was prepped and draped in the usual sterile fashion.

## 2023-05-02 ENCOUNTER — OFFICE VISIT (OUTPATIENT)
Dept: GYNECOLOGIC ONCOLOGY | Facility: CLINIC | Age: 58
End: 2023-05-02
Payer: MEDICARE

## 2023-05-02 VITALS
RESPIRATION RATE: 18 BRPM | TEMPERATURE: 98.2 F | WEIGHT: 116.3 LBS | DIASTOLIC BLOOD PRESSURE: 79 MMHG | HEIGHT: 62 IN | OXYGEN SATURATION: 98 % | BODY MASS INDEX: 21.4 KG/M2 | HEART RATE: 114 BPM | SYSTOLIC BLOOD PRESSURE: 183 MMHG

## 2023-05-02 DIAGNOSIS — N90.3 VULVAR DYSPLASIA: Primary | ICD-10-CM

## 2023-05-02 DIAGNOSIS — Z01.419 WELL WOMAN EXAM WITH ROUTINE GYNECOLOGICAL EXAM: ICD-10-CM

## 2023-05-02 PROCEDURE — 88305 TISSUE EXAM BY PATHOLOGIST: CPT | Performed by: OBSTETRICS & GYNECOLOGY

## 2023-05-03 ENCOUNTER — OFFICE VISIT (OUTPATIENT)
Dept: FAMILY MEDICINE CLINIC | Facility: CLINIC | Age: 58
End: 2023-05-03
Payer: MEDICARE

## 2023-05-03 ENCOUNTER — PATIENT ROUNDING (BHMG ONLY) (OUTPATIENT)
Dept: GYNECOLOGIC ONCOLOGY | Facility: CLINIC | Age: 58
End: 2023-05-03
Payer: MEDICARE

## 2023-05-03 VITALS
OXYGEN SATURATION: 98 % | BODY MASS INDEX: 21.57 KG/M2 | WEIGHT: 117.2 LBS | HEART RATE: 71 BPM | DIASTOLIC BLOOD PRESSURE: 86 MMHG | RESPIRATION RATE: 14 BRPM | HEIGHT: 62 IN | SYSTOLIC BLOOD PRESSURE: 148 MMHG

## 2023-05-03 DIAGNOSIS — F41.9 ANXIETY: ICD-10-CM

## 2023-05-03 DIAGNOSIS — M54.2 NECK PAIN: Primary | ICD-10-CM

## 2023-05-03 RX ORDER — DICYCLOMINE HYDROCHLORIDE 10 MG/1
10 CAPSULE ORAL 3 TIMES DAILY
COMMUNITY
End: 2023-05-03 | Stop reason: SDUPTHER

## 2023-05-03 RX ORDER — METHOCARBAMOL 500 MG/1
500 TABLET, FILM COATED ORAL 4 TIMES DAILY
Qty: 20 TABLET | Refills: 0 | Status: SHIPPED | OUTPATIENT
Start: 2023-05-03

## 2023-05-03 RX ORDER — CETIRIZINE HYDROCHLORIDE 10 MG/1
10 TABLET ORAL DAILY
Qty: 90 TABLET | Refills: 3 | Status: SHIPPED | OUTPATIENT
Start: 2023-05-03

## 2023-05-03 RX ORDER — DICYCLOMINE HYDROCHLORIDE 10 MG/1
10 CAPSULE ORAL 3 TIMES DAILY
Qty: 90 CAPSULE | Refills: 3 | Status: SHIPPED | OUTPATIENT
Start: 2023-05-03

## 2023-05-03 RX ORDER — HYDROXYZINE PAMOATE 25 MG/1
25 CAPSULE ORAL EVERY 6 HOURS PRN
Qty: 60 CAPSULE | Refills: 3 | Status: SHIPPED | OUTPATIENT
Start: 2023-05-03

## 2023-05-03 RX ORDER — FAMOTIDINE 40 MG/1
40 TABLET, FILM COATED ORAL DAILY
COMMUNITY

## 2023-05-03 RX ORDER — CLORAZEPATE DIPOTASSIUM 7.5 MG/1
7.5 TABLET ORAL 2 TIMES DAILY
COMMUNITY

## 2023-05-03 NOTE — PROGRESS NOTES
Subjective   Sarah Beth Patel is a 58 y.o. female  [facility] (Int #[id number]. Has upcoming appt with psych (anxiety), starts PT next week for both shoulders (hx of surgeries)) and Edema (Bilateral ankles, knees)      History of Present Illness  Patient is a pleasant new patient 58-year-old female who comes in complaining of multiple complaints she is having some neck pain neck stiffness she starts PT next week patient states she has had anxiety but she is seeing psychiatry next week she needs refill of Remeron and hydroxyzine patient's pain with flexion extension in her neck      The following portions of the patient's history were reviewed and updated as appropriate: allergies, current medications, past social history and problem list    Review of Systems   Constitutional: Negative for fatigue and unexpected weight change.   Respiratory: Negative for cough, chest tightness and shortness of breath.    Cardiovascular: Negative for chest pain, palpitations and leg swelling.   Gastrointestinal: Negative for nausea.   Musculoskeletal: Positive for neck pain.   Skin: Negative for color change and rash.   Neurological: Negative for dizziness, syncope, weakness and headaches.       Objective     Vitals:    05/03/23 1429   BP: 148/86   Pulse: 71   Resp: 14   SpO2: 98%       Physical Exam  Vitals and nursing note reviewed.   Constitutional:       General: She is not in acute distress.     Appearance: Normal appearance. She is well-developed. She is not ill-appearing, toxic-appearing or diaphoretic.   Neck:      Vascular: No carotid bruit or JVD.   Cardiovascular:      Rate and Rhythm: Normal rate and regular rhythm.      Pulses: Normal pulses.      Heart sounds: Normal heart sounds. No murmur heard.  Pulmonary:      Effort: Pulmonary effort is normal. No respiratory distress.      Breath sounds: Normal breath sounds.   Abdominal:      Palpations: Abdomen is soft.      Tenderness: There is no abdominal tenderness.    Musculoskeletal:      Cervical back: Decreased range of motion.   Skin:     General: Skin is warm and dry.   Neurological:      Mental Status: She is alert.         Assessment & Plan     Diagnoses and all orders for this visit:    1. Neck pain (Primary)  -     methocarbamol (ROBAXIN) 500 MG tablet; Take 1 tablet by mouth 4 (Four) Times a Day.  Dispense: 20 tablet; Refill: 0    2. Anxiety  -     hydrOXYzine pamoate (VISTARIL) 25 MG capsule; Take 1 capsule by mouth Every 6 (Six) Hours As Needed for Anxiety.  Dispense: 60 capsule; Refill: 3    Other orders  -     cetirizine (zyrTEC) 10 MG tablet; Take 1 tablet by mouth Daily.  Dispense: 90 tablet; Refill: 3  -     dicyclomine (BENTYL) 10 MG capsule; Take 1 capsule by mouth 3 (Three) Times a Day.  Dispense: 90 capsule; Refill: 3    I spent 15 minutes in patient care: Reviewing records prior to the visit, examining the patient, entering orders and documentation    Part of this note may be an electronic transcription/translation of spoken language to printed text using the Dragon Dictation System.

## 2023-05-03 NOTE — PROGRESS NOTES
A My-Chart message has been sent to the patient for [place] [facility]

## 2023-05-04 LAB
CYTO UR: NORMAL
LAB AP CASE REPORT: NORMAL
LAB AP CLINICAL INFORMATION: NORMAL
PATH REPORT.FINAL DX SPEC: NORMAL
PATH REPORT.GROSS SPEC: NORMAL
REF LAB TEST METHOD: NORMAL

## 2023-05-04 RX ORDER — TRIAMCINOLONE ACETONIDE 0.25 MG/G
1 OINTMENT TOPICAL 2 TIMES WEEKLY
Qty: 80 G | Refills: 1 | Status: SHIPPED | OUTPATIENT
Start: 2023-05-04

## 2023-05-04 NOTE — PROGRESS NOTES
Please let Ms. Patel know that her biopsy dues show an inflammatory skin condition like her daughter's. I have sent her a prescription for a cream to apply to vulva twice a week.     She will need to be called with . Thanks!

## 2023-05-05 ENCOUNTER — PATIENT ROUNDING (BHMG ONLY) (OUTPATIENT)
Dept: FAMILY MEDICINE CLINIC | Facility: CLINIC | Age: 58
End: 2023-05-05
Payer: MEDICARE

## 2023-05-05 NOTE — PROGRESS NOTES
[facility] a My-Chart message has been sent to the patient for PATIENT ROUNDING with a My chart message

## 2023-05-08 NOTE — PROGRESS NOTES
New Patient Office Visit      Patient Name: Sarah Beth Patel  : 1965   MRN: [id number]     Referring Physician: Self referral    Chief Complaint:  Vulvar itching and irritation    History of Present Illness: Sarah Beth Patel is a 58 y.o. female who is here today as a consultation with gynecologic oncology for a long history of vulvar itching and irritation.  She reports that this started several years ago when she was in [place].  She was seen by a gynecologic oncologist there who did a biopsy and some there was no cancer.  She thinks that she was post to start a topical ointment but never did.  She did report continued itching since this visit several years ago.  Fortunately the itching decreased about 3 to 4 weeks ago.  She is otherwise doing well and without complaint today.    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   • Depression    • Dislocated elbow    • Dislocation, shoulder    • Frozen shoulder 2023   • Hypertension    • Neck strain    • Rotator cuff syndrome    • Sickle cell anemia    • Subluxation of patella    • Tear of meniscus of knee    • Tendinitis of knee    • Visual impairment        Past Surgical History:   Past Surgical History:   Procedure Laterality Date   • EYE SURGERY     • HYSTERECTOMY     • KNEE SURGERY Left    • SHOULDER SURGERY      Right shoulder surgery 2017 left shoulder surgery 2014   • VAGINAL BIOPSY         Family History:   Family History   Problem Relation Age of Onset   • Dislocations Paternal Grandmother         Dislocación de hombro jered   • Cancer Maternal Aunt         Cancer de Matriz   • Diabetes Maternal Grandfather    • Depression Mother    • Hyperlipidemia Mother    • Heart disease Father    • Hypertension Father    • Alcohol  "abuse Brother    • Asthma Daughter        Social History:   Social History     Socioeconomic History   • Marital status: Single   Tobacco Use   • Smoking status: Every Day     Packs/day: 1.50     Years: 10.00     Pack years: 15.00     Types: Cigarettes     Start date: 2023     Last attempt to quit: 2023     Years since quittin.0   Substance and Sexual Activity   • Alcohol use: Not Currently   • Drug use: Never   • Sexual activity: Not Currently     Partners: Female       Past OB/GYN History:   OB History   No obstetric history on file.        Medications:     Current Outpatient Medications:   •  cetirizine (zyrTEC) 10 MG tablet, Take 1 tablet by mouth Daily., Disp: 90 tablet, Rfl: 3  •  clorazepate (TRANXENE) 7.5 MG tablet, Take 1 tablet by mouth 2 (Two) Times a Day., Disp: , Rfl:   •  dicyclomine (BENTYL) 10 MG capsule, Take 1 capsule by mouth 3 (Three) Times a Day., Disp: 90 capsule, Rfl: 3  •  DULoxetine (CYMBALTA) 60 MG capsule, Take 1 capsule by mouth Daily., Disp: , Rfl:   •  famotidine (PEPCID) 40 MG tablet, Take 1 tablet by mouth Daily., Disp: , Rfl:   •  hydrOXYzine pamoate (VISTARIL) 25 MG capsule, Take 1 capsule by mouth Every 6 (Six) Hours As Needed for Anxiety., Disp: 60 capsule, Rfl: 3  •  methocarbamol (ROBAXIN) 500 MG tablet, Take 1 tablet by mouth 4 (Four) Times a Day., Disp: 20 tablet, Rfl: 0  •  mirtazapine (REMERON) 15 MG tablet, Take 1 tablet by mouth Every Night., Disp: , Rfl:   •  triamcinolone (KENALOG) 0.025 % ointment, Apply 1 application topically to the appropriate area as directed 2 (Two) Times a Week., Disp: 80 g, Rfl: 1    Allergies:   Allergies   Allergen Reactions   • Iodine Swelling     Facial swelling   • Asa [Aspirin] Rash   • Lamotrigine Rash       Objective     Physical Exam:  Vital Signs:   Vitals:    23 1411   BP: (!) 183/79   Pulse: 114   Resp: 18   Temp: 98.2 °F (36.8 °C)   SpO2: 98%   Weight: 52.8 kg (116 lb 4.8 oz)   Height: 157.5 cm (62\")   PainSc:   6 "   PainLoc: Generalized     BMI: Body mass index is 21.27 kg/m².   ECOG score: 1           PHQ-2 Depression Screening  Little interest or pleasure in doing things?     Feeling down, depressed, or hopeless?     PHQ-2 Total Score       Physical Examination:   General appearance - alert, well appearing, and in no distress and oriented to person, place, and time  Mental status - normal mood, behavior, speech, dress, motor activity, and thought processes  Eyes - sclera anicteric, left eye normal, right eye normal  Ears - right ear normal, left ear normal  Nose - mask  Mouth - mask  Lymphatics - no palpable lymphadenopathy, no hepatosplenomegaly  Lungs - normal respiratory effort, clear to auscultation  Heart - normal rate, regular rhythm, normal S1, S2, no murmurs, rubs, clicks or gallops  Abdomen - soft, nontender, nondistended, no masses or organomegaly  Pelvic - external genitalia normal and without lesions, representative biopsy taken of area that was previously most bothersome, uterus, cervix, and adnexa surgically absent  Neurological - alert, oriented, normal speech, no focal findings or movement disorder noted  Musculoskeletal - no joint tenderness, deformity or swelling  Extremities - peripheral pulses normal, no pedal edema, no clubbing or cyanosis  Skin - normal coloration and turgor, no rashes, no suspicious skin lesions noted     Labs:  Lab Results   Component Value Date    WBC 11.65 (H) 04/27/2023    HGB 11.5 (L) 04/27/2023    HCT 36.6 04/27/2023    .5 (H) 04/27/2023     04/27/2023       Lab Results   Component Value Date    GLUCOSE 113 (H) 04/27/2023    BUN 19 04/27/2023    CREATININE 0.68 04/27/2023    EGFR 101.1 04/27/2023    BCR 27.9 (H) 04/27/2023    K 3.9 04/27/2023    CO2 24.0 04/27/2023    CALCIUM 9.3 04/27/2023    ALBUMIN 4.3 04/27/2023    BILITOT <0.2 04/27/2023    AST 13 04/27/2023    ALT 11 04/27/2023       No results found for:     Pathology:  None    Imaging:  CT Abdomen  Pelvis Without Contrast    Result Date: 2023  Impression: No acute abdominal or pelvic abnormality. Electronically Signed: Jose Yanez  2023 11:25 AM EDT  Workstation ID: [id number]    CT Abdomen Pelvis With Contrast    Result Date: 4/15/2023  Impression: No evidence of acute abnormality in the chest, abdomen or pelvis. Hepatic steatosis. Electronically Signed: Fausto GarzaDen  4/15/2023 2:41 PM EDT  Workstation ID: [id number]    XR Chest 1 View    Result Date: 3/28/2023  Impression: 1.An acute pulmonary process is not apparent. Electronically Signed: Ananda Linder  3/28/2023 1:10 PM EDT  Workstation ID: [id number]    CT Angiogram Chest    Result Date: 4/15/2023  Impression: No evidence of acute abnormality in the chest, abdomen or pelvis. Hepatic steatosis. Electronically Signed: Fausto GarzaDen  4/15/2023 2:41 PM EDT  Workstation ID: [id number]    XR spine cervical 2 or 3 views    Result Date: 2023  Degenerative changes with no acute bony abnormality. Images reviewed, interpreted, and dictated by Dr. Marc Mason. Transcribed by ANIRUDH Almodovar(R).    Procedure Note - Vulvar Biopsy     Patient Name: Sarah Beth Patel  : 1965   MRN: [id number]   Date: 23  Time: 08:44 EDT    Physician: Hanna Linda MD    Procedure Performed: Vulvar biopsy    Indication for Procedure: Patient is a 58 y.o. female who presents with a symptomatic vulvar lesion.     Allergies:   Allergies   Allergen Reactions   • Iodine Swelling     Facial swelling   • Asa [Aspirin] Rash   • Lamotrigine Rash       Patient's medications reviewed.     Procedure Detail(s):  The area of interest was cleansed with Betadine.  The skin was infiltrated with 2 cc 1% lidocaine a with epinephrine.  A 3 mm punch biopsy was performed with the specimen handed off for permanent pathology. Silver nitrate was required for hemostasis     Complications: The patient tolerated the procedure well with no complications.      Findings: See exam findings.     Biopsy/Specimens: Labeled and sent to pathology.      Assessment / Plan    Sarah Beth Patel is a 58 y.o. who presents with a longstanding history of vulvar itching.  A representative biopsy was taken to assess pathology.  I suspect this may represent lichen sclerosis versus lichen planus which is a condition that her daughter also has.  Based on pathology we will likely start triamcinolone ointment twice weekly as a maintenance therapy.  Of note she has a history of a hysterectomy that she reports was for bleeding.  She did request a Pap test today and this was performed.    Diagnoses and all orders for this visit:    1. Vulvar dysplasia (Primary)  -     Tissue Pathology Exam; Future  -     Tissue Pathology Exam    2. Well woman exam with routine gynecological exam  -     LIQUID-BASED PAP SMEAR, [facility] ([facility]); Future  -     LIQUID-BASED PAP SMEAR, [facility] ([facility])    Other orders  -     triamcinolone (KENALOG) 0.025 % ointment; Apply 1 application topically to the appropriate area as directed 2 (Two) Times a Week.  Dispense: 80 g; Refill: 1         Follow Up: Pending pathology    SERGO Linda MD  Gynecologic Oncology

## 2023-05-10 ENCOUNTER — TELEPHONE (OUTPATIENT)
Dept: FAMILY MEDICINE CLINIC | Facility: CLINIC | Age: 58
End: 2023-05-10

## 2023-05-10 NOTE — TELEPHONE ENCOUNTER
Caller: Sarah Beth Prince    Relationship: Self    Best call back number: [phone]    What is the best time to reach you: ANY    Who are you requesting to speak with (clinical staff, provider,  specific staff member): DECLAN GARY OR HIS NURSE    What was the call regarding: PATIENT IS RETAINING LIQUIDS IN HER LEGS BECAUSE SHE CANNOT WEAR HER SOCKS. HER TOE NAILS ARE TURNING DARK. IS THIS BAD CIRCULATION? SHE THINKS. THIS HAS BEEN GOING ON FOR A WEEK. SHE WAS TOLD THAT SHE WAS HAVING SOME SORT OF PANIC ATTACKS. SHE SAID SHE CAN SEE HER NAILS ARE TURNING BLACK. PLEASE ADVISE ASAP. PATIENT HAS AN APPOINTMENT FOR THIS ON Friday 05.12.23.    Do you require a callback: IF CONCERNS YES

## 2023-05-12 ENCOUNTER — OFFICE VISIT (OUTPATIENT)
Dept: FAMILY MEDICINE CLINIC | Facility: CLINIC | Age: 58
End: 2023-05-12
Payer: MEDICARE

## 2023-05-12 ENCOUNTER — LAB (OUTPATIENT)
Dept: FAMILY MEDICINE CLINIC | Facility: CLINIC | Age: 58
End: 2023-05-12
Payer: MEDICARE

## 2023-05-12 VITALS
OXYGEN SATURATION: 99 % | BODY MASS INDEX: 21.64 KG/M2 | HEIGHT: 62 IN | DIASTOLIC BLOOD PRESSURE: 82 MMHG | SYSTOLIC BLOOD PRESSURE: 138 MMHG | WEIGHT: 117.6 LBS | HEART RATE: 81 BPM | RESPIRATION RATE: 14 BRPM

## 2023-05-12 DIAGNOSIS — M25.562 ARTHRALGIA OF BOTH KNEES: ICD-10-CM

## 2023-05-12 DIAGNOSIS — M25.561 ARTHRALGIA OF BOTH KNEES: ICD-10-CM

## 2023-05-12 DIAGNOSIS — M25.522 ARTHRALGIA OF BOTH ELBOWS: ICD-10-CM

## 2023-05-12 DIAGNOSIS — M25.521 ARTHRALGIA OF BOTH ELBOWS: Primary | ICD-10-CM

## 2023-05-12 DIAGNOSIS — M25.522 ARTHRALGIA OF BOTH ELBOWS: Primary | ICD-10-CM

## 2023-05-12 DIAGNOSIS — M25.521 ARTHRALGIA OF BOTH ELBOWS: ICD-10-CM

## 2023-05-12 LAB — ERYTHROCYTE [SEDIMENTATION RATE] IN BLOOD: 22 MM/HR (ref 0–30)

## 2023-05-12 PROCEDURE — 85652 RBC SED RATE AUTOMATED: CPT | Performed by: PHYSICIAN ASSISTANT

## 2023-05-12 PROCEDURE — 86038 ANTINUCLEAR ANTIBODIES: CPT | Performed by: PHYSICIAN ASSISTANT

## 2023-05-12 PROCEDURE — 99213 OFFICE O/P EST LOW 20 MIN: CPT | Performed by: PHYSICIAN ASSISTANT

## 2023-05-12 PROCEDURE — 86431 RHEUMATOID FACTOR QUANT: CPT | Performed by: PHYSICIAN ASSISTANT

## 2023-05-12 RX ORDER — NABUMETONE 500 MG/1
500 TABLET, FILM COATED ORAL 2 TIMES DAILY PRN
Qty: 20 TABLET | Refills: 0 | Status: SHIPPED | OUTPATIENT
Start: 2023-05-12

## 2023-05-12 NOTE — PROGRESS NOTES
Subjective   Sarah Beth Prince is a 58 y.o. female  Edema (Bilat ankles for several days) and Elbow Pain (Both elbows, painful when resting on hard surfaces and bending. Difficulty sleeping d/t pain and unable to get comfortable)      History of Present Illness  Patient is a pleasant 58-year-old female who comes in complaining of edema joint pain in elbows, knees concerns about possible rheumatological condition.  Patient's had a first-degree relative with lupus patient has stiffness in all her joints but worse in elbows and knees some swelling      The following portions of the patient's history were reviewed and updated as appropriate: allergies, current medications, past social history and problem list    Review of Systems   Constitutional: Negative.    HENT: Negative.    Eyes: Negative.    Respiratory: Negative.    Cardiovascular: Negative.    Gastrointestinal: Negative.    Endocrine: Negative.    Genitourinary: Negative.    Musculoskeletal: Positive for arthralgias.   Skin: Negative.    Allergic/Immunologic: Negative.    Neurological: Negative.    Hematological: Negative.    Psychiatric/Behavioral: Negative.    All other systems reviewed and are negative.      Objective     Vitals:    05/12/23 1103   BP: 138/82   Pulse: 81   Resp: 14   SpO2: 99%       Physical Exam  Vitals and nursing note reviewed.   Constitutional:       General: She is not in acute distress.     Appearance: Normal appearance. She is well-developed. She is not ill-appearing, toxic-appearing or diaphoretic.   Neck:      Vascular: No carotid bruit or JVD.   Cardiovascular:      Rate and Rhythm: Normal rate and regular rhythm.      Pulses: Normal pulses.      Heart sounds: Normal heart sounds. No murmur heard.  Pulmonary:      Effort: Pulmonary effort is normal. No respiratory distress.      Breath sounds: Normal breath sounds.   Abdominal:      Palpations: Abdomen is soft.      Tenderness: There is no abdominal tenderness.    Skin:     General: Skin is warm and dry.   Neurological:      Mental Status: She is alert.         Assessment & Plan     Diagnoses and all orders for this visit:    1. Arthralgia of both elbows (Primary)  -     Cancel: DAMIAN; Future  -     Cancel: Rheumatoid Factor; Future  -     Cancel: Sedimentation Rate; Future  -     DAMIAN; Future  -     Rheumatoid Factor; Future  -     Sedimentation Rate; Future  -     nabumetone (RELAFEN) 500 MG tablet; Take 1 tablet by mouth 2 (Two) Times a Day As Needed for Mild Pain.  Dispense: 20 tablet; Refill: 0    2. Arthralgia of both knees  -     Cancel: DAMIAN; Future  -     Cancel: Rheumatoid Factor; Future  -     Cancel: Sedimentation Rate; Future  -     DAMIAN; Future  -     Rheumatoid Factor; Future  -     Sedimentation Rate; Future  -     nabumetone (RELAFEN) 500 MG tablet; Take 1 tablet by mouth 2 (Two) Times a Day As Needed for Mild Pain.  Dispense: 20 tablet; Refill: 0     I spent 15 minutes in patient care: Reviewing records prior to the visit, examining the patient, entering orders and documentation    Part of this note may be an electronic transcription/translation of spoken language to printed text using the Dragon Dictation System.    Answers for HPI/ROS submitted by the patient on 5/10/2023  What is the primary reason for your visit?: Lower Extremity Injury  Incident occurred: 3 to 5 days ago  Incident location: at home  Injury mechanism: other  Pain location: left thigh, left leg, right thigh, right leg, right foot  Pain - numeric: 5/10  Pain course: constant  Foreign body present: other       chief complaint : shortness of breath        SUBJECTIVE / OVERNIGHT EVENTS: unable to obtain ros from pt , pt lethargic intermittently opening eyes asking for water ,  pts family next to pts bed    MEDICATIONS  (STANDING):  dextrose 5% + sodium chloride 0.9% with potassium chloride 20 mEq/L 1000 milliLiter(s) (75 mL/Hr) IV Continuous <Continuous>  dextrose 5%. 1000 milliLiter(s) (50 mL/Hr) IV Continuous <Continuous>  dextrose 50% Injectable 12.5 Gram(s) IV Push once  dextrose 50% Injectable 25 Gram(s) IV Push once  dextrose 50% Injectable 25 Gram(s) IV Push once  insulin lispro (HumaLOG) corrective regimen sliding scale   SubCutaneous every 6 hours  magnesium sulfate  IVPB 2 Gram(s) IV Intermittent once  piperacillin/tazobactam IVPB. 3.375 Gram(s) IV Intermittent every 8 hours    MEDICATIONS  (PRN):  acetaminophen  Suppository 650 milliGRAM(s) Rectal every 6 hours PRN For Temp greater than 38 C (100.4 F)  acetaminophen  Suppository. 650 milliGRAM(s) Rectal every 6 hours PRN Mild Pain (1 - 3)  dextrose 40% Gel 15 Gram(s) Oral once PRN Blood Glucose LESS THAN 70 milliGRAM(s)/deciliter  glucagon  Injectable 1 milliGRAM(s) IntraMuscular once PRN Glucose LESS THAN 70 milligrams/deciliter  HYDROmorphone  Injectable 0.2 milliGRAM(s) IV Push every 3 hours PRN tachypnic / sob  ondansetron Injectable 4 milliGRAM(s) IV Push every 6 hours PRN Nausea    Vital Signs Last 24 Hrs  T(C): 37.1 (22 Aug 2018 15:43), Max: 37.3 (21 Aug 2018 21:11)  T(F): 98.7 (22 Aug 2018 15:43), Max: 99.1 (21 Aug 2018 21:11)  HR: 93 (22 Aug 2018 15:43) (75 - 111)  BP: 98/63 (22 Aug 2018 15:43) (98/63 - 113/57)  BP(mean): --  RR: 18 (22 Aug 2018 15:43) (17 - 19)  SpO2: 99% (22 Aug 2018 15:43) (96% - 100%)  unable to obtain ros from pt - pt lethargic      PHYSICAL EXAM:  CHEST/LUNG: bronchial breath sounds + ant  HEART:  S1 , S2 +  ABDOMEN: soft, bs+  EXTREMITIES: trace edema  NEUROLOGY: lethargic      LABS:      143  |  108<H>  |  22  ----------------------------<  156<H>  3.2<L>   |  24  |  1.46<H>    Ca    10.2      22 Aug 2018 06:04  Phos  1.6       Mg     1.3         TPro  6.9  /  Alb  1.9<L>  /  TBili  1.2  /  DBili      /  AST  68<H>  /  ALT  26  /  AlkPhos  57      Creatinine Trend: 1.46 <--, 1.61 <--, 1.80 <--, 2.08 <--                        11.2   12.74 )-----------( 105      ( 22 Aug 2018 06:04 )             33.7     Urine Studies:  Urinalysis Basic - ( 20 Aug 2018 15:10 )    Color: YELLOW / Appearance: HAZY / S.021 / pH: 5.5  Gluc: TRACE / Ketone: NEGATIVE  / Bili: SMALL / Urobili: 1   Blood: NEGATIVE / Protein: 30 / Nitrite: NEGATIVE   Leuk Esterase: NEGATIVE / RBC: 3-5 / WBC 2-5   Sq Epi: OCC / Non Sq Epi:  / Bacteria: FEW              LIVER FUNCTIONS - ( 21 Aug 2018 06:00 )  Alb: 1.9 g/dL / Pro: 6.9 g/dL / ALK PHOS: 57 u/L / ALT: 26 u/L / AST: 68 u/L / GGT: x

## 2023-05-13 LAB — CHROMATIN AB SERPL-ACNC: <10 IU/ML (ref 0–14)

## 2023-05-15 LAB — ANA SER QL: NEGATIVE

## 2023-05-18 ENCOUNTER — TELEPHONE (OUTPATIENT)
Dept: FAMILY MEDICINE CLINIC | Facility: CLINIC | Age: 58
End: 2023-05-18
Payer: MEDICARE

## 2023-05-18 RX ORDER — CYCLOBENZAPRINE HCL 10 MG
10 TABLET ORAL 3 TIMES DAILY PRN
Qty: 30 TABLET | Refills: 1 | Status: SHIPPED | OUTPATIENT
Start: 2023-05-18

## 2023-05-19 ENCOUNTER — OFFICE VISIT (OUTPATIENT)
Dept: ORTHOPEDIC SURGERY | Facility: CLINIC | Age: 58
End: 2023-05-19
Payer: MEDICARE

## 2023-05-19 VITALS
DIASTOLIC BLOOD PRESSURE: 88 MMHG | BODY MASS INDEX: 20.8 KG/M2 | WEIGHT: 113 LBS | SYSTOLIC BLOOD PRESSURE: 130 MMHG | HEIGHT: 62 IN

## 2023-05-19 DIAGNOSIS — M25.562 ACUTE BILATERAL KNEE PAIN: Primary | ICD-10-CM

## 2023-05-19 DIAGNOSIS — M25.561 ACUTE BILATERAL KNEE PAIN: Primary | ICD-10-CM

## 2023-05-19 PROCEDURE — 99213 OFFICE O/P EST LOW 20 MIN: CPT | Performed by: ORTHOPAEDIC SURGERY

## 2023-05-22 DIAGNOSIS — M25.521 ARTHRALGIA OF BOTH ELBOWS: ICD-10-CM

## 2023-05-22 DIAGNOSIS — M25.562 ARTHRALGIA OF BOTH KNEES: ICD-10-CM

## 2023-05-22 DIAGNOSIS — M25.522 ARTHRALGIA OF BOTH ELBOWS: ICD-10-CM

## 2023-05-22 DIAGNOSIS — M25.561 ARTHRALGIA OF BOTH KNEES: ICD-10-CM

## 2023-05-24 RX ORDER — NABUMETONE 500 MG/1
TABLET, FILM COATED ORAL
Qty: 20 TABLET | Refills: 0 | Status: SHIPPED | OUTPATIENT
Start: 2023-05-24 | End: 2023-05-26

## 2023-05-26 ENCOUNTER — APPOINTMENT (OUTPATIENT)
Dept: GENERAL RADIOLOGY | Facility: HOSPITAL | Age: 58
End: 2023-05-26
Payer: MEDICARE

## 2023-05-26 ENCOUNTER — OFFICE VISIT (OUTPATIENT)
Dept: ORTHOPEDIC SURGERY | Facility: CLINIC | Age: 58
End: 2023-05-26
Payer: MEDICARE

## 2023-05-26 ENCOUNTER — HOSPITAL ENCOUNTER (EMERGENCY)
Facility: HOSPITAL | Age: 58
Discharge: HOME | End: 2023-05-26
Attending: EMERGENCY MEDICINE
Payer: MEDICARE

## 2023-05-26 VITALS
HEIGHT: 62 IN | BODY MASS INDEX: 20.8 KG/M2 | DIASTOLIC BLOOD PRESSURE: 84 MMHG | TEMPERATURE: 98.6 F | RESPIRATION RATE: 16 BRPM | WEIGHT: 113 LBS | HEART RATE: 98 BPM | SYSTOLIC BLOOD PRESSURE: 149 MMHG | OXYGEN SATURATION: 98 %

## 2023-05-26 VITALS
WEIGHT: 113.1 LBS | BODY MASS INDEX: 20.81 KG/M2 | HEIGHT: 62 IN | DIASTOLIC BLOOD PRESSURE: 90 MMHG | SYSTOLIC BLOOD PRESSURE: 148 MMHG

## 2023-05-26 DIAGNOSIS — M25.541 JOINT PAIN IN FINGERS OF BOTH HANDS: Primary | ICD-10-CM

## 2023-05-26 DIAGNOSIS — M25.542 JOINT PAIN IN FINGERS OF BOTH HANDS: Primary | ICD-10-CM

## 2023-05-26 DIAGNOSIS — M25.50 ARTHRALGIA OF MULTIPLE JOINTS: Primary | ICD-10-CM

## 2023-05-26 DIAGNOSIS — R70.0 ELEVATED SED RATE: ICD-10-CM

## 2023-05-26 DIAGNOSIS — M25.579 JOINT PAIN OF ANKLE AND FOOT, UNSPECIFIED LATERALITY: ICD-10-CM

## 2023-05-26 LAB
ALBUMIN SERPL-MCNC: 4.8 G/DL (ref 3.5–5.2)
ALBUMIN/GLOB SERPL: 1.5 G/DL
ALP SERPL-CCNC: 88 U/L (ref 39–117)
ALT SERPL W P-5'-P-CCNC: 10 U/L (ref 1–33)
ANION GAP SERPL CALCULATED.3IONS-SCNC: 13 MMOL/L (ref 5–15)
AST SERPL-CCNC: 16 U/L (ref 1–32)
BACTERIA UR QL AUTO: NORMAL /HPF
BASOPHILS # BLD AUTO: 0.03 10*3/MM3 (ref 0–0.2)
BASOPHILS NFR BLD AUTO: 0.3 % (ref 0–1.5)
BILIRUB SERPL-MCNC: 0.4 MG/DL (ref 0–1.2)
BILIRUB UR QL STRIP: NEGATIVE
BUN SERPL-MCNC: 12 MG/DL (ref 6–20)
BUN/CREAT SERPL: 17.6 (ref 7–25)
CALCIUM SPEC-SCNC: 10.1 MG/DL (ref 8.6–10.5)
CHLORIDE SERPL-SCNC: 99 MMOL/L (ref 98–107)
CLARITY UR: CLEAR
CO2 SERPL-SCNC: 27 MMOL/L (ref 22–29)
COLOR UR: YELLOW
CREAT SERPL-MCNC: 0.68 MG/DL (ref 0.57–1)
DEPRECATED RDW RBC AUTO: 46.9 FL (ref 37–54)
EGFRCR SERPLBLD CKD-EPI 2021: 101.1 ML/MIN/1.73
EOSINOPHIL # BLD AUTO: 0.03 10*3/MM3 (ref 0–0.4)
EOSINOPHIL NFR BLD AUTO: 0.3 % (ref 0.3–6.2)
ERYTHROCYTE [DISTWIDTH] IN BLOOD BY AUTOMATED COUNT: 13.1 % (ref 12.3–15.4)
ERYTHROCYTE [SEDIMENTATION RATE] IN BLOOD: 45 MM/HR (ref 0–30)
GLOBULIN UR ELPH-MCNC: 3.3 GM/DL
GLUCOSE SERPL-MCNC: 93 MG/DL (ref 65–99)
GLUCOSE UR STRIP-MCNC: NEGATIVE MG/DL
HCT VFR BLD AUTO: 45.4 % (ref 34–46.6)
HGB BLD-MCNC: 15.2 G/DL (ref 12–15.9)
HGB UR QL STRIP.AUTO: ABNORMAL
HOLD SPECIMEN: NORMAL
HYALINE CASTS UR QL AUTO: NORMAL /LPF
IMM GRANULOCYTES # BLD AUTO: 0.03 10*3/MM3 (ref 0–0.05)
IMM GRANULOCYTES NFR BLD AUTO: 0.3 % (ref 0–0.5)
KETONES UR QL STRIP: NEGATIVE
LEUKOCYTE ESTERASE UR QL STRIP.AUTO: ABNORMAL
LYMPHOCYTES # BLD AUTO: 2.34 10*3/MM3 (ref 0.7–3.1)
LYMPHOCYTES NFR BLD AUTO: 23.5 % (ref 19.6–45.3)
MCH RBC QN AUTO: 32.1 PG (ref 26.6–33)
MCHC RBC AUTO-ENTMCNC: 33.5 G/DL (ref 31.5–35.7)
MCV RBC AUTO: 95.8 FL (ref 79–97)
MONOCYTES # BLD AUTO: 0.45 10*3/MM3 (ref 0.1–0.9)
MONOCYTES NFR BLD AUTO: 4.5 % (ref 5–12)
NEUTROPHILS NFR BLD AUTO: 7.06 10*3/MM3 (ref 1.7–7)
NEUTROPHILS NFR BLD AUTO: 71.1 % (ref 42.7–76)
NITRITE UR QL STRIP: NEGATIVE
NRBC BLD AUTO-RTO: 0 /100 WBC (ref 0–0.2)
PH UR STRIP.AUTO: 7 [PH] (ref 5–8)
PLATELET # BLD AUTO: 381 10*3/MM3 (ref 140–450)
PMV BLD AUTO: 7.6 FL (ref 6–12)
POTASSIUM SERPL-SCNC: 4 MMOL/L (ref 3.5–5.2)
PROCALCITONIN SERPL-MCNC: 0.05 NG/ML (ref 0–0.25)
PROT SERPL-MCNC: 8.1 G/DL (ref 6–8.5)
PROT UR QL STRIP: NEGATIVE
RBC # BLD AUTO: 4.74 10*6/MM3 (ref 3.77–5.28)
RBC # UR STRIP: NORMAL /HPF
REF LAB TEST METHOD: NORMAL
SODIUM SERPL-SCNC: 139 MMOL/L (ref 136–145)
SP GR UR STRIP: 1.01 (ref 1–1.03)
SQUAMOUS #/AREA URNS HPF: NORMAL /HPF
UROBILINOGEN UR QL STRIP: ABNORMAL
WBC # UR STRIP: NORMAL /HPF
WBC NRBC COR # BLD: 9.94 10*3/MM3 (ref 3.4–10.8)
WHOLE BLOOD HOLD COAG: NORMAL
WHOLE BLOOD HOLD SPECIMEN: NORMAL

## 2023-05-26 PROCEDURE — 85652 RBC SED RATE AUTOMATED: CPT | Performed by: EMERGENCY MEDICINE

## 2023-05-26 PROCEDURE — 73130 X-RAY EXAM OF HAND: CPT

## 2023-05-26 PROCEDURE — 73630 X-RAY EXAM OF FOOT: CPT

## 2023-05-26 PROCEDURE — 84145 PROCALCITONIN (PCT): CPT | Performed by: EMERGENCY MEDICINE

## 2023-05-26 PROCEDURE — 99283 EMERGENCY DEPT VISIT LOW MDM: CPT

## 2023-05-26 PROCEDURE — 85025 COMPLETE CBC W/AUTO DIFF WBC: CPT | Performed by: EMERGENCY MEDICINE

## 2023-05-26 PROCEDURE — 80053 COMPREHEN METABOLIC PANEL: CPT | Performed by: EMERGENCY MEDICINE

## 2023-05-26 PROCEDURE — 81001 URINALYSIS AUTO W/SCOPE: CPT | Performed by: EMERGENCY MEDICINE

## 2023-05-26 RX ORDER — BACLOFEN 10 MG/1
10 TABLET ORAL EVERY 12 HOURS PRN
Qty: 14 TABLET | Refills: 0 | Status: SHIPPED | OUTPATIENT
Start: 2023-05-26 | End: 2023-06-02 | Stop reason: SDUPTHER

## 2023-05-26 RX ORDER — ACETAMINOPHEN 500 MG
1000 TABLET ORAL ONCE
Status: COMPLETED | OUTPATIENT
Start: 2023-05-26 | End: 2023-05-26

## 2023-05-26 RX ORDER — SODIUM CHLORIDE 0.9 % (FLUSH) 0.9 %
10 SYRINGE (ML) INJECTION AS NEEDED
Status: DISCONTINUED | OUTPATIENT
Start: 2023-05-26 | End: 2023-05-26 | Stop reason: HOSPADM

## 2023-05-26 RX ADMIN — ACETAMINOPHEN 1000 MG: 500 TABLET ORAL at 11:04

## 2023-05-26 NOTE — ED PROVIDER NOTES
Subjective   History of Present Illness  This is a pleasant 58-year-old female originally from [place].  I have used [employer] [id number] as my .  She is followed by [facility].  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  She is currently disabled due to joint pain.  She has been seeing Dr. Nunes here.  I reviewed his notes.  She had previous surgery on her left shoulder x2 in [place] and on her right shoulder x2 in [place].  She has had diffuse pain in her joints especially hands and feet.  X-rays of her shoulders and knees have been unremarkable and she has had some steroid injections with minimal relief.  She started seeing physical therapy on Tuesday but is really not had any relief from that.    She comes the emergency room today with continued joint pain as well as swelling of her joints and swelling of her face that occurred this morning and she took Zyrtec and Benadryl about 6 AM and it is currently improved.    She has no history of rheumatoid arthritis or other inflammatory conditions that she knows of.  When she was in [place] the only medication she was on was for psychiatry.  She had no fevers or chills or systemic symptoms.  Bowel movements and urine have been okay.  She had no rashes only swelling.    She can do activities of daily living like going to a store and cleaning but it hurts her a lot as difficult for her to do so.      Other systems reviewed and are negative except as noted above.        Review of Systems   All other systems reviewed and are negative.      Past Medical History:   Diagnosis Date   • Acromioclavicular separation    • Allergic     Aspirina y al Iodo   • Anxiety     Ataques de pánico y ansiedad   • Asthma    • Bipolar disorder    • Cancer     Dos tías maternas y mihir prima hermana   • Depression    • Dislocated elbow    • Dislocation, shoulder    • Frozen shoulder  1/04/2023   • Hypertension    • Knee sprain    • Knee swelling    • Neck strain    • Preeclampsia    • Rotator cuff syndrome    • Sickle cell anemia    • Subluxation of patella    • Tear of meniscus of knee    • Tendinitis of knee    • Visual impairment        Allergies   Allergen Reactions   • Iodine Swelling     Facial swelling   • Asa [Aspirin] Rash   • Lamotrigine Rash       Past Surgical History:   Procedure Laterality Date   • EYE SURGERY     • HYSTERECTOMY     • KNEE SURGERY Left    • SHOULDER SURGERY      Right shoulder surgery 2017 left shoulder surgery 2014   • TOTAL ABDOMINAL HYSTERECTOMY     • TOTAL ABDOMINAL HYSTERECTOMY WITH SALPINGO OOPHORECTOMY     • VAGINAL BIOPSY         Family History   Problem Relation Age of Onset   • Dislocations Paternal Grandmother         Dislocación de hombro jered   • Cancer Maternal Aunt         Cancer de Matriz   • Diabetes Maternal Grandfather    • Depression Mother    • Hyperlipidemia Mother    • Heart disease Father    • Hypertension Father    • Coronary artery disease Father    • Alcohol abuse Brother    • Asthma Daughter    • Uterine cancer Maternal Uncle        Social History     Socioeconomic History   • Marital status: Single   Tobacco Use   • Smoking status: Every Day     Packs/day: 1.50     Years: 10.00     Pack years: 15.00     Types: Cigarettes     Start date: 5/23/2023     Last attempt to quit: 5/24/2023   • Smokeless tobacco: Never   Substance and Sexual Activity   • Alcohol use: Not Currently     Comment: No yunior   • Drug use: Never   • Sexual activity: Not Currently     Partners: Female     Birth control/protection: Abstinence, Hysterectomy           Objective   Physical Exam  Vitals and nursing note reviewed.   Constitutional:       Appearance: She is normal weight.      Comments: This is a pleasant 58-year-old alert and oriented GCS 15 she is in no distress.   HENT:      Head: Normocephalic and atraumatic.      Right Ear: External ear normal.       Left Ear: External ear normal.      Mouth/Throat:      Mouth: Mucous membranes are moist.      Comments: Large Sadorus tonsil otherwise unremarkable  Eyes:      Extraocular Movements: Extraocular movements intact.      Conjunctiva/sclera: Conjunctivae normal.      Pupils: Pupils are equal, round, and reactive to light.   Cardiovascular:      Rate and Rhythm: Normal rate and regular rhythm.      Pulses: Normal pulses.      Heart sounds: Normal heart sounds.   Pulmonary:      Effort: Pulmonary effort is normal.      Breath sounds: Normal breath sounds.   Abdominal:      General: Abdomen is flat. Bowel sounds are normal. There is no distension.      Palpations: Abdomen is soft. There is no mass.   Musculoskeletal:      Cervical back: Normal range of motion and neck supple.      Comments: Hands carefully examined good pulses no active synovitis rash or ulnar deviation nailbeds are unremarkable good range of motion but it hurts when I squeeze her hands.  Her wrist are without lesion.  Elbows without lesion.  Shoulders without lesions and she demonstrates pretty good range of motion.    Lower extremities no hip pain.  Her knees are without swelling.  Her calves are soft there is no venous cords.  Her feet are unremarkable nailbeds and palms and soles are also unremarkable no acute synovitis.   Skin:     Capillary Refill: Capillary refill takes less than 2 seconds.   Neurological:      Mental Status: She is alert.      Comments: Face symmetric, voice strong, tongue midline.  Vision, hearing, and speech preserved.  No focal weakness         Procedures           ED Course            Recent Results (from the past 24 hour(s))   Green Top (Gel)    Collection Time: 05/26/23  9:36 AM   Result Value Ref Range    Extra Tube Hold for add-ons.    Lavender Top    Collection Time: 05/26/23  9:36 AM   Result Value Ref Range    Extra Tube hold for add-on    Gold Top - SST    Collection Time: 05/26/23  9:36 AM   Result Value Ref Range     Extra Tube Hold for add-ons.    Gray Top    Collection Time: 05/26/23  9:36 AM   Result Value Ref Range    Extra Tube Hold for add-ons.    Light Blue Top    Collection Time: 05/26/23  9:36 AM   Result Value Ref Range    Extra Tube Hold for add-ons.    Comprehensive Metabolic Panel    Collection Time: 05/26/23  9:36 AM    Specimen: Blood   Result Value Ref Range    Glucose 93 65 - 99 mg/dL    BUN 12 6 - 20 mg/dL    Creatinine 0.68 0.57 - 1.00 mg/dL    Sodium 139 136 - 145 mmol/L    Potassium 4.0 3.5 - 5.2 mmol/L    Chloride 99 98 - 107 mmol/L    CO2 27.0 22.0 - 29.0 mmol/L    Calcium 10.1 8.6 - 10.5 mg/dL    Total Protein 8.1 6.0 - 8.5 g/dL    Albumin 4.8 3.5 - 5.2 g/dL    ALT (SGPT) 10 1 - 33 U/L    AST (SGOT) 16 1 - 32 U/L    Alkaline Phosphatase 88 39 - 117 U/L    Total Bilirubin 0.4 0.0 - 1.2 mg/dL    Globulin 3.3 gm/dL    A/G Ratio 1.5 g/dL    BUN/Creatinine Ratio 17.6 7.0 - 25.0    Anion Gap 13.0 5.0 - 15.0 mmol/L    eGFR 101.1 >60.0 mL/min/1.73   Procalcitonin    Collection Time: 05/26/23  9:36 AM    Specimen: Blood   Result Value Ref Range    Procalcitonin 0.05 0.00 - 0.25 ng/mL   Sedimentation Rate    Collection Time: 05/26/23  9:36 AM    Specimen: Blood   Result Value Ref Range    Sed Rate 45 (H) 0 - 30 mm/hr   CBC Auto Differential    Collection Time: 05/26/23  9:36 AM    Specimen: Blood   Result Value Ref Range    WBC 9.94 3.40 - 10.80 10*3/mm3    RBC 4.74 3.77 - 5.28 10*6/mm3    Hemoglobin 15.2 12.0 - 15.9 g/dL    Hematocrit 45.4 34.0 - 46.6 %    MCV 95.8 79.0 - 97.0 fL    MCH 32.1 26.6 - 33.0 pg    MCHC 33.5 31.5 - 35.7 g/dL    RDW 13.1 12.3 - 15.4 %    RDW-SD 46.9 37.0 - 54.0 fl    MPV 7.6 6.0 - 12.0 fL    Platelets 381 140 - 450 10*3/mm3    Neutrophil % 71.1 42.7 - 76.0 %    Lymphocyte % 23.5 19.6 - 45.3 %    Monocyte % 4.5 (L) 5.0 - 12.0 %    Eosinophil % 0.3 0.3 - 6.2 %    Basophil % 0.3 0.0 - 1.5 %    Immature Grans % 0.3 0.0 - 0.5 %    Neutrophils, Absolute 7.06 (H) 1.70 - 7.00 10*3/mm3     Lymphocytes, Absolute 2.34 0.70 - 3.10 10*3/mm3    Monocytes, Absolute 0.45 0.10 - 0.90 10*3/mm3    Eosinophils, Absolute 0.03 0.00 - 0.40 10*3/mm3    Basophils, Absolute 0.03 0.00 - 0.20 10*3/mm3    Immature Grans, Absolute 0.03 0.00 - 0.05 10*3/mm3    nRBC 0.0 0.0 - 0.2 /100 WBC   Urinalysis With Microscopic If Indicated (No Culture) - Urine, Clean Catch    Collection Time: 05/26/23 11:00 AM    Specimen: Urine, Clean Catch   Result Value Ref Range    Color, UA Yellow Yellow, Straw    Appearance, UA Clear Clear    pH, UA 7.0 5.0 - 8.0    Specific Gravity, UA 1.006 1.001 - 1.030    Glucose, UA Negative Negative    Ketones, UA Negative Negative    Bilirubin, UA Negative Negative    Blood, UA Trace (A) Negative    Protein, UA Negative Negative    Leuk Esterase, UA Trace (A) Negative    Nitrite, UA Negative Negative    Urobilinogen, UA 0.2 E.U./dL 0.2 - 1.0 E.U./dL   Urinalysis, Microscopic Only - Urine, Clean Catch    Collection Time: 05/26/23 11:00 AM    Specimen: Urine, Clean Catch   Result Value Ref Range    RBC, UA 0-2 None Seen, 0-2 /HPF    WBC, UA 0-2 None Seen, 0-2 /HPF    Bacteria, UA None Seen None Seen, Trace /HPF    Squamous Epithelial Cells, UA 0-2 None Seen, 0-2 /HPF    Hyaline Casts, UA None Seen 0 - 6 /LPF    Methodology Automated Microscopy      Note: In addition to lab results from this visit, the labs listed above may include labs taken at another facility or during a different encounter within the last 24 hours. Please correlate lab times with ED admission and discharge times for further clarification of the services performed during this visit.    XR Foot 3+ View Left   Final Result   Impression:   No evidence of acute osseous abnormality of the hands or feet bilaterally.         Electronically Signed: Fausto Crenshaw     5/26/2023 11:51 AM EDT     Workstation ID: [id number]      XR Foot 3+ View Right   Final Result   Impression:   No evidence of acute osseous abnormality of the hands or feet  bilaterally.         Electronically Signed: Fausto Crenshaw     5/26/2023 11:51 AM EDT     Workstation ID: [id number]      XR Hand 3+ View Left   Final Result   Impression:   No evidence of acute osseous abnormality of the hands or feet bilaterally.         Electronically Signed: Fausto Crenshaw     5/26/2023 11:51 AM EDT     Workstation ID: [id number]      XR Hand 3+ View Right   Final Result   Impression:   No evidence of acute osseous abnormality of the hands or feet bilaterally.         Electronically Signed: Fausto Crenshaw     5/26/2023 11:51 AM EDT     Workstation ID: [id number]        Vitals:    05/26/23 0938 05/26/23 1000 05/26/23 1111 05/26/23 1130   BP: 153/97 155/97  149/84   Patient Position:    Sitting   Pulse: 86 90 98 98   Resp:       Temp:       TempSrc:       SpO2: 99% 97% 98% 98%   Weight:       Height:         Medications   acetaminophen (TYLENOL) tablet 1,000 mg (1,000 mg Oral Given 5/26/23 1104)     ECG/EMG Results (last 24 hours)     ** No results found for the last 24 hours. **        No orders to display                               SKIP reviewed by Allen Bond MD       Medical Decision Making        I reviewed all available studies at the bedside with the patient including her x-rays which I personally reviewed.  They are bland.  Her labs are also bland save for a modest elevation of the sedimentation rate of 45.    With her joint pain all over currently she does not have stigmata of rheumatoid arthritis.  She had a tearful episode here and admits that mental health has been tough for her and she really misses [place].  She is considering going back there.  She thinks she may be somaticize and some regarding her joint pain we discussed strategies to help her deal better with this including some cognitive behavioral therapy and imaging that was translated to her and she is agreeable to try this.    I talked her about using Tylenol for joint pain and then I prescribe Zostrix cream  to see if that helps and we will try her on a little muscle relaxant to such as baclofen if she has a component of fibromyalgia.    I will refer to the arthritis Center [facility] is she has continued joint pain she may need further serologic evaluation.  I have also asked her to follow-up with her primary care doctor and her psychiatrist continue treatment of her other problems.  She will return to the ED if worse in any way.    All are agreeable with the plan    Elevated sed rate: undiagnosed new problem with uncertain prognosis  Joint pain in fingers of both hands: undiagnosed new problem with uncertain prognosis  Joint pain of ankle and foot, unspecified laterality: undiagnosed new problem with uncertain prognosis  Amount and/or Complexity of Data Reviewed  External Data Reviewed: labs, radiology and notes.  Labs: ordered. Decision-making details documented in ED Course.  Radiology: ordered and independent interpretation performed. Decision-making details documented in ED Course.      Risk  OTC drugs.  Prescription drug management.          Final diagnoses:   Joint pain in fingers of both hands   Joint pain of ankle and foot, unspecified laterality   Elevated sed rate       ED Disposition  ED Disposition     ED Disposition   Discharge    Condition   Stable    Comment   --             Gonzales Sullivan, PA  [address] [facility]  [address]  [place] [address]  [phone]    Schedule an appointment as soon as possible for a visit       [facility]  [address]  [place] [address]  [phone]  Schedule an appointment as soon as possible for a visit            Medication List      New Prescriptions    baclofen 10 MG tablet  Commonly known as: LIORESAL  Take 1 tablet by mouth Every 12 (Twelve) Hours As Needed (as needed for joint pain).     Capsaicin 0.033 % cream  Apply 1 application topically 3 (Three) Times a Day As Needed (pain in hands and feet).        Stop     cyclobenzaprine 10 MG tablet  Commonly known as: FLEXERIL           Where to Get Your Medications      These medications were sent to [id number] - [place], [place] - [address] AT [facility] [address] - [phone]  - [phone]  [address], [facility] [address]    Phone: [phone]   · baclofen 10 MG tablet  · Capsaicin 0.033 % cream          Allen Bond MD  05/26/23 5037

## 2023-05-26 NOTE — PROGRESS NOTES
[facility] Note    Subjective     Chief Complaint   Patient presents with    Follow-up     Acute bilateral knee pain and Bilateral shoulder pain        HPI  Sarah Beth Prince is a 58 y.o. female.  Patient returns for follow-up evaluation.  She was last seen by Dr. Nunes last Friday, diagnosed with bilateral shoulder pain due to supraspinatus tendinitis.  She is already been given corticosteroid injection which she reports helped for a couple of days in the right shoulder she returns this Friday complaining of the same symptoms but now she notes that she has bilateral elbow pain, knee pain, ankle pain.    She states she is started PT at [facility] x1 session.  She is awaiting C-spine MRI.    Pain scale: 6/10.  Any movements of any joints causes exacerbation of pain.  No reported numbness or tingling.  The only thing that helps is medication with codeine in it.    Patient denies any fever, chills, night sweats or other constitutional symptoms.    [place] medical phone : [facility], #[phone].    Past Medical History:   Diagnosis Date    Acromioclavicular separation     Allergic     Aspirina y al Iodo    Anxiety     Ataques de pánico y ansiedad    Asthma     Bipolar disorder     Cancer     Dos tías maternas y mihir prima hermana    Cataract     CHF (congestive heart failure)     Mi tia por parte de mi padre    COPD (chronic obstructive pulmonary disease)     Mi dav materno    Depression     Diabetes mellitus     Mi abuela paterna    Dislocated elbow     Dislocation, shoulder     Diverticulosis     Mi hermana    Fibromyalgia, primary     Mi madre y hermana    Frozen shoulder 1/04/2023    Hypertension     Knee sprain     Knee swelling     Neck strain     Obesity     Mi hermana    Preeclampsia     Rotator cuff syndrome     Sickle cell anemia     Subluxation of patella     Tear of meniscus of knee     Tendinitis of knee     Visual impairment       Past Surgical History:   Procedure  Laterality Date    ADENOIDECTOMY      APPENDECTOMY      CARDIAC CATHETERIZATION      COLONOSCOPY      CORONARY ARTERY BYPASS GRAFT      EYE SURGERY      HYSTERECTOMY      KNEE SURGERY Left     SHOULDER SURGERY      Right shoulder surgery 2017 left shoulder surgery 2014    TOTAL ABDOMINAL HYSTERECTOMY      TOTAL ABDOMINAL HYSTERECTOMY WITH SALPINGO OOPHORECTOMY      VAGINAL BIOPSY        Family History   Problem Relation Age of Onset    Dislocations Paternal Grandmother         Dislocación de hombro jered    Cancer Maternal Aunt         Cancer de Matriz    Diabetes Maternal Grandfather     Depression Mother     Hyperlipidemia Mother     Heart disease Father     Hypertension Father     Coronary artery disease Father     Alcohol abuse Brother         [place]    Asthma Daughter     Uterine cancer Maternal Uncle     Liver disease Maternal Grandmother         Abuela materna     Social History     Socioeconomic History    Marital status: Single   Tobacco Use    Smoking status: Every Day     Packs/day: 1.50     Years: 10.00     Pack years: 15.00     Types: Cigarettes     Start date: 2023     Last attempt to quit: 2023     Years since quittin.0    Smokeless tobacco: Never   Substance and Sexual Activity    Alcohol use: Not Currently     Comment: No yunior    Drug use: Never    Sexual activity: Not Currently     Partners: Female     Birth control/protection: Abstinence, Hysterectomy      Current Outpatient Medications on File Prior to Visit   Medication Sig Dispense Refill    cetirizine (zyrTEC) 10 MG tablet Take 1 tablet by mouth Daily. 90 tablet 3    clorazepate (TRANXENE) 7.5 MG tablet Take 1 tablet by mouth 2 (Two) Times a Day.      dicyclomine (BENTYL) 10 MG capsule Take 1 capsule by mouth 3 (Three) Times a Day. 90 capsule 3    estazolam (PROSOM) 2 MG tablet       famotidine (PEPCID) 40 MG tablet Take 1 tablet by mouth Daily.      hydrOXYzine pamoate (VISTARIL) 25 MG capsule Take 1 capsule by mouth Every  "6 (Six) Hours As Needed for Anxiety. 60 capsule 3    triamcinolone (KENALOG) 0.025 % ointment Apply 1 application topically to the appropriate area as directed 2 (Two) Times a Week. 80 g 1     No current facility-administered medications on file prior to visit.      Allergies   Allergen Reactions    Iodine Swelling     Facial swelling    Asa [Aspirin] Rash    Lamotrigine Rash        The following portions of the patient's history were reviewed and updated as appropriate: allergies, current medications, past family history, past medical history, past social history, past surgical history, and problem list.    Review of Systems   Constitutional: Negative.    HENT: Negative.     Eyes: Negative.    Respiratory: Negative.     Cardiovascular: Negative.    Gastrointestinal: Negative.    Endocrine: Negative.    Genitourinary: Negative.    Musculoskeletal:  Positive for arthralgias.   Skin: Negative.    Allergic/Immunologic: Negative.    Neurological: Negative.    Hematological: Negative.    Psychiatric/Behavioral: Negative.        Objective      Physical Exam  /90   Ht 157.5 cm (62.01\")   Wt 51.3 kg (113 lb 1.5 oz)   BMI 20.68 kg/m²     Body mass index is 20.68 kg/m².    GENERAL APPEARANCE: awake, alert & oriented x 3, in no acute distress and well developed, well nourished  PSYCH: normal mood and affect  LUNGS:  breathing nonlabored, no wheezing  EYES: sclera anicteric, pupils equal  CARDIOVASCULAR: palpable pulses. Capillary refill less than 2 seconds  INTEGUMENTARY: skin intact, no clubbing, cyanosis  NEUROLOGIC:  Normal Sensation         Ortho Exam  Bilateral shoulders, elbows, knees, ankles  Positive tenderness throughout all joints.  No evidence of soft tissue swelling.  Full range of motion all joints but positive pain with range of motion.  Motor/sensory grossly intact C5-T1 and L2-S1.      Imaging/Studies  No new imaging today.    Laboratory data  5/12/2023: DAMIAN negative, RF negative (less than 10), ESR " 22.  Assessment/Plan        ICD-10-CM ICD-9-CM   1. Arthralgia of multiple joints  M25.50 719.49       No orders of the defined types were placed in this encounter.       -Multiple joint arthralgia--bilateral shoulders, elbows, knees, ankles.  -No new imaging today.  -Patient needs to continue with PT.  -Based on the number of ED visits and multiple joint arthralgia recommend PCP place referral to rheumatology for further evaluation.  -Recommend OTC NSAIDs/pain medication as needed.  If she requires stronger pain medication then she will have to go through her PCP or pain management.  -Follow up as needed.  -Questions and concerns answered.    History, exam and imaging discussed with Dr. Nunes, who agrees with the above assessment and plan.      Jyoti Brink PA-C  06/05/23  08:13 EDT               EMR Dragon/Transcription disclaimer:  Much of this encounter note is an electronic transcription of spoken language to printed text. Electronic transcription of spoken language may permit erroneous, or at times, nonsensical words or phrases to be inadvertently transcribed. Although I have reviewed the note for such errors, some may still exist.

## 2023-06-02 ENCOUNTER — OFFICE VISIT (OUTPATIENT)
Dept: FAMILY MEDICINE CLINIC | Facility: CLINIC | Age: 58
End: 2023-06-02

## 2023-06-02 VITALS
SYSTOLIC BLOOD PRESSURE: 118 MMHG | HEIGHT: 62 IN | RESPIRATION RATE: 16 BRPM | DIASTOLIC BLOOD PRESSURE: 70 MMHG | WEIGHT: 115.6 LBS | BODY MASS INDEX: 21.27 KG/M2

## 2023-06-02 DIAGNOSIS — M25.522 ARTHRALGIA OF BOTH ELBOWS: Primary | ICD-10-CM

## 2023-06-02 DIAGNOSIS — M25.521 ARTHRALGIA OF BOTH ELBOWS: Primary | ICD-10-CM

## 2023-06-02 RX ORDER — BACLOFEN 10 MG/1
10 TABLET ORAL EVERY 12 HOURS PRN
Qty: 30 TABLET | Refills: 1 | Status: SHIPPED | OUTPATIENT
Start: 2023-06-02

## 2023-06-02 RX ORDER — MIRTAZAPINE 30 MG/1
30 TABLET, FILM COATED ORAL NIGHTLY
COMMUNITY

## 2023-06-02 RX ORDER — BUPROPION HYDROCHLORIDE 150 MG/1
150 TABLET ORAL EVERY MORNING
COMMUNITY

## 2023-06-02 NOTE — PROGRESS NOTES
Subjective   Sarah Beth Prince is a 58 y.o. female  Joint pain (Seen in [facility] ED May 26-see note. Neg XRs of hands/feet. She was prescribed Baclofen which has helped. Referred to [facility] and will call to schedule an appointment.) and Anxiety (Recently saw psych ([facility] in [place]) who adjusted some medications)      History of Present Illness  Patient is a pleasant 58-year-old female who comes in complaining of joint pain was seen at the [facility] [place] emergency department on May 26 patient had negative x-rays of feet and hands she was scribed baclofen that really helped symptoms patient states she seen psychiatry in [facility] which is changed some of her medicines she feels like she is doing better  The following portions of the patient's history were reviewed and updated as appropriate: allergies, current medications, past social history and problem list    Review of Systems   Constitutional:  Negative for appetite change, diaphoresis, fatigue and unexpected weight change.   Eyes:  Negative for visual disturbance.   Respiratory:  Negative for cough, chest tightness and shortness of breath.    Cardiovascular:  Negative for chest pain, palpitations and leg swelling.   Gastrointestinal:  Negative for diarrhea, nausea and vomiting.   Endocrine: Negative for polydipsia, polyphagia and polyuria.   Skin:  Negative for color change and rash.   Neurological:  Negative for dizziness, syncope, weakness, light-headedness, numbness and headaches.     Objective     Vitals:    06/02/23 1031   BP: 118/70   Resp: 16       Physical Exam  Vitals and nursing note reviewed.   Constitutional:       General: She is not in acute distress.     Appearance: Normal appearance. She is well-developed. She is not ill-appearing, toxic-appearing or diaphoretic.   Neck:      Thyroid: No thyromegaly.      Vascular: No carotid bruit or JVD.   Cardiovascular:      Rate and Rhythm: Normal rate and regular  rhythm.      Pulses: Normal pulses.      Heart sounds: Normal heart sounds. No murmur heard.  Pulmonary:      Effort: Pulmonary effort is normal. No respiratory distress.      Breath sounds: Normal breath sounds.   Abdominal:      Palpations: Abdomen is soft. There is no mass.      Tenderness: There is no abdominal tenderness.   Musculoskeletal:      Cervical back: Neck supple.   Lymphadenopathy:      Cervical: No cervical adenopathy.   Skin:     General: Skin is warm and dry.   Neurological:      Mental Status: She is alert.      Sensory: No sensory deficit.       Assessment & Plan     Diagnoses and all orders for this visit:    1. Arthralgia of both elbows (Primary)  -     baclofen (LIORESAL) 10 MG tablet; Take 1 tablet by mouth Every 12 (Twelve) Hours As Needed (as needed for joint pain).  Dispense: 30 tablet; Refill: 1  -     Ambulatory Referral to Rheumatology     I spent 15 minutes in patient care: Reviewing records prior to the visit, examining the patient, entering orders and documentation    Part of this note may be an electronic transcription/translation of spoken language to printed text using the Dragon Dictation System.    Answers for HPI/ROS submitted by the patient on 5/31/2023  Please describe your symptoms.: Mucho dolor en todas las articulaciones, rodillas, codos, nudillos, tobillos etcétera.....  Have you had these symptoms before?: No  How long have you been having these symptoms?: Greater than 2 weeks  Please list any medications you are currently taking for this condition.: Baclofen 10 mg  Please describe any probable cause for these symptoms. : No tengo idea de que me lo causa gissell no soporto el dolor.  What is the primary reason for your visit?: Other

## 2023-06-05 ENCOUNTER — TELEPHONE (OUTPATIENT)
Dept: FAMILY MEDICINE CLINIC | Facility: CLINIC | Age: 58
End: 2023-06-05

## 2023-06-05 NOTE — TELEPHONE ENCOUNTER
Caller: Sarah Beth Prince    Relationship: Self    Best call back number: [phone]    What is the medical concern/diagnosis: JOINT PAIN, PAIN IN ANKLE AND FOOT, JOINTS ARE SWELLING    What specialty or service is being requested: RHEUMATOLOGY    What is the provider, practice or medical service name: [facility]    What is the office location: [place]    Any additional details: PATIENT WAS IN ER ON 05/26/2023 AND WAS TOLD SHE NEEDS TO SEE A RHEUMATOLOGIST AND NEEDS A REFERRAL. PLEASE ADVISE        
Patient requesting rheumatology referral. Thanks!  
None known

## 2023-06-09 ENCOUNTER — TELEPHONE (OUTPATIENT)
Dept: FAMILY MEDICINE CLINIC | Facility: CLINIC | Age: 58
End: 2023-06-09

## 2023-06-09 NOTE — TELEPHONE ENCOUNTER
Caller: Sarah Beth Prince    Relationship: Self    Best call back number:     What is the medical concern/diagnosis: JOINT PAIN BOTH HANDS, ANKLE AND FEET    What specialty or service is being requested: RHEUMATOLOGY    What is the provider, practice or medical service name: [facility]      What is the office location: [address] [place] [address]    What is the office phone number: [phone]    Any additional details: THE PATIENT WAS IN THE ER AND WAS TOLD T0 SEE THIS OFFICE. SHE CALLED TO MAKE AN APPOINTMENT AND WAS TOLD TO CALL HER PCP TO MAKE THE REFERRAL. SHE NEEDS TO GET IN ASAP

## 2023-06-09 NOTE — TELEPHONE ENCOUNTER
Referral shows Dr. Flores. Did her info have to be faxed there? I know it takes a while to get into rheumatology...

## 2023-06-15 ENCOUNTER — TELEPHONE (OUTPATIENT)
Dept: FAMILY MEDICINE CLINIC | Facility: CLINIC | Age: 58
End: 2023-06-15

## 2023-06-15 NOTE — TELEPHONE ENCOUNTER
Caller: Sarah Beth Prince    Relationship: Self    Best call back number: [phone]    What is the medical concern/diagnosis: JOINT PAIN    What specialty or service is being requested: RHEUMATOLOGY     What is the provider, practice or medical service name: [place] [id number]          What is the office phone number: [phone]    Any additional details: THE PATIENT RECEIVED A LETTER THAT SAID THAT SHE COULD NOT BE SEEN BY RHEUMATOLOGY UNTIL 08/17/2023 THE PATIENT WOULD LIKE THE DOCTOR TO HELP HER TO GET IN TO RHEUMATOLOGY SOONER THE PATIENT STATES THAT SHE HAS A LOT OF JOINT PAIN

## 2023-06-26 ENCOUNTER — TELEPHONE (OUTPATIENT)
Dept: FAMILY MEDICINE CLINIC | Facility: CLINIC | Age: 58
End: 2023-06-26

## 2023-06-26 NOTE — TELEPHONE ENCOUNTER
Caller: Sarah Beth Prince    Relationship: Self    Best call back number: [phone]    What is the medical concern/diagnosis: JOINT PAIN    What specialty or service is being requested: RHEUMATOLOGY      Any additional details: THE PATIENT STATES THAT SHE WAS REFERRED TO [facility] SHE STATES THAT THEY DO NOT TAKE HER INSURANCE SHE WOULD LIKE TO BE REFERRED TO ANOTHER RHEUMATOLOGIST

## 2023-06-27 NOTE — TELEPHONE ENCOUNTER
It's sent to [facility]. I told her to call them if she doesn't hear back in a couple days,  but her records were sent. She has been in the ER at  several times for joint pain. I hope they accept her, because  is very specific about who they see.

## 2023-07-27 DIAGNOSIS — M25.521 ARTHRALGIA OF BOTH ELBOWS: ICD-10-CM

## 2023-07-27 DIAGNOSIS — M25.522 ARTHRALGIA OF BOTH ELBOWS: ICD-10-CM

## 2023-07-28 ENCOUNTER — OFFICE VISIT (OUTPATIENT)
Dept: FAMILY MEDICINE CLINIC | Facility: CLINIC | Age: 58
End: 2023-07-28
Payer: MEDICARE

## 2023-07-28 VITALS
TEMPERATURE: 97.1 F | SYSTOLIC BLOOD PRESSURE: 116 MMHG | OXYGEN SATURATION: 99 % | HEIGHT: 62 IN | DIASTOLIC BLOOD PRESSURE: 76 MMHG | WEIGHT: 124 LBS | RESPIRATION RATE: 14 BRPM | BODY MASS INDEX: 22.82 KG/M2 | HEART RATE: 83 BPM

## 2023-07-28 DIAGNOSIS — Z00.00 ROUTINE GENERAL MEDICAL EXAMINATION AT A HEALTH CARE FACILITY: ICD-10-CM

## 2023-07-28 DIAGNOSIS — M79.10 MYALGIA: ICD-10-CM

## 2023-07-28 DIAGNOSIS — Z00.00 ENCOUNTER FOR SUBSEQUENT ANNUAL WELLNESS VISIT (AWV) IN MEDICARE PATIENT: Primary | ICD-10-CM

## 2023-07-28 RX ORDER — PREDNISONE 10 MG/1
10 TABLET ORAL DAILY
Qty: 14 TABLET | Refills: 0 | Status: SHIPPED | OUTPATIENT
Start: 2023-07-28

## 2023-07-28 RX ORDER — BUPROPION HYDROCHLORIDE 300 MG/1
300 TABLET ORAL DAILY
COMMUNITY

## 2023-07-28 RX ORDER — PROPRANOLOL HYDROCHLORIDE 10 MG/1
10 TABLET ORAL 3 TIMES DAILY PRN
COMMUNITY
Start: 2023-07-13

## 2023-07-28 RX ORDER — BACLOFEN 10 MG/1
TABLET ORAL
Qty: 30 TABLET | Refills: 1 | Status: SHIPPED | OUTPATIENT
Start: 2023-07-28

## 2023-07-28 NOTE — PROGRESS NOTES
The ABCs of the Annual Wellness Visit  Initial Medicare Wellness Visit    Chief Complaint   Patient presents with    Medicare Wellness-Initial Visit     UTD on colonoscopy    Joint pain     F/U from rheumatology-see labs     Subjective   History of Present Illness:  Sarah Beth Prince is a 58 y.o. female who presents for an Initial Medicare Wellness Visit.    The following portions of the patient's history were reviewed and   updated as appropriate: allergies, current medications, past family history, past medical history, past social history, past surgical history, and problem list.    Compared to one year ago, the patient feels her physical   health is better.    Compared to one year ago, the patient feels her mental   health is the same.    Recent Hospitalizations:  She was not admitted to the hospital during the last year.       Current Medical Providers:  Patient Care Team:  Gonzales Sullivan PA as PCP - General (Family Medicine)    Outpatient Medications Prior to Visit   Medication Sig Dispense Refill    baclofen (LIORESAL) 10 MG tablet TAKE 1 TABLET BY MOUTH EVERY 12 HOURS AS NEEDED FOR JOINT PAIN 30 tablet 1    buPROPion XL (WELLBUTRIN XL) 300 MG 24 hr tablet Take 1 tablet by mouth Daily.      Capsaicin 0.033 % cream Apply 1 application topically 3 (Three) Times a Day As Needed (pain in hands and feet). 56.6 g 0    cetirizine (zyrTEC) 10 MG tablet Take 1 tablet by mouth Daily. 90 tablet 3    clorazepate (TRANXENE) 7.5 MG tablet Take 1 tablet by mouth 2 (Two) Times a Day.      dicyclomine (BENTYL) 10 MG capsule Take 1 capsule by mouth 3 (Three) Times a Day. 90 capsule 3    estazolam (PROSOM) 2 MG tablet       hydroCHLOROthiazide (HYDRODIURIL) 25 MG tablet Take 1 tablet by mouth Daily. 30 tablet 2    hydrOXYzine pamoate (VISTARIL) 25 MG capsule Take 1 capsule by mouth Every 6 (Six) Hours As Needed for Anxiety. 60 capsule 3    mirtazapine (REMERON) 30 MG tablet Take 1 tablet by mouth Every  "Night.      propranolol (INDERAL) 10 MG tablet Take 1 tablet by mouth 3 (Three) Times a Day As Needed. for anxiety      triamcinolone (KENALOG) 0.025 % ointment Apply 1 application topically to the appropriate area as directed 2 (Two) Times a Week. 80 g 1    buPROPion XL (WELLBUTRIN XL) 150 MG 24 hr tablet Take 1 tablet by mouth Every Morning.      famotidine (PEPCID) 40 MG tablet Take 1 tablet by mouth Daily. (Patient not taking: Reported on 6/20/2023)       No facility-administered medications prior to visit.       No opioid medication identified on active medication list. I have reviewed chart for other potential  high risk medication/s and harmful drug interactions in the elderly.        Aspirin is not on active medication list.  Aspirin use is not indicated based on review of current medical condition/s. Risk of harm outweighs potential benefits.  .    There is no problem list on file for this patient.    Advance Care Planning   ACP discussion was declined by the patient. bookle    Review of Systems   Constitutional:  Negative for fatigue and unexpected weight change.   Respiratory:  Negative for cough, chest tightness and shortness of breath.    Cardiovascular:  Negative for chest pain, palpitations and leg swelling.   Gastrointestinal:  Negative for nausea.   Musculoskeletal:  Positive for arthralgias.   Skin:  Negative for color change and rash.   Neurological:  Negative for dizziness, syncope, weakness and headaches.       Objective       Vitals:    07/28/23 1117   BP: 116/76   Pulse: 83   Resp: 14   Temp: 97.1 °F (36.2 °C)   TempSrc: Infrared   SpO2: 99%   Weight: 56.2 kg (124 lb)   Height: 157.5 cm (62.01\")   PainSc:   6     BMI Readings from Last 1 Encounters:   07/28/23 22.67 kg/m²   BMI is within normal parameters. No follow-up required.    Does the patient have evidence of cognitive impairment? No    Physical Exam  Vitals and nursing note reviewed.   Constitutional:       General: She is not in acute " distress.     Appearance: Normal appearance. She is well-developed. She is not ill-appearing, toxic-appearing or diaphoretic.   HENT:      Head: Normocephalic and atraumatic.      Right Ear: External ear normal.      Left Ear: External ear normal.   Eyes:      Conjunctiva/sclera: Conjunctivae normal.      Pupils: Pupils are equal, round, and reactive to light.   Neck:      Thyroid: No thyromegaly.      Vascular: No carotid bruit or JVD.   Cardiovascular:      Rate and Rhythm: Normal rate and regular rhythm.      Pulses: Normal pulses.      Heart sounds: Normal heart sounds. No murmur heard.  Pulmonary:      Effort: Pulmonary effort is normal. No respiratory distress.      Breath sounds: Normal breath sounds.   Abdominal:      General: Bowel sounds are normal.      Palpations: Abdomen is soft. There is no mass.      Tenderness: There is no abdominal tenderness.   Musculoskeletal:         General: No swelling. Normal range of motion.      Cervical back: Normal range of motion and neck supple.   Lymphadenopathy:      Cervical: No cervical adenopathy.   Skin:     General: Skin is warm and dry.      Findings: No lesion or rash.   Neurological:      Mental Status: She is alert and oriented to person, place, and time.      Cranial Nerves: No cranial nerve deficit.      Sensory: No sensory deficit.      Motor: No weakness.      Coordination: Coordination normal.      Gait: Gait normal.      Deep Tendon Reflexes: Reflexes are normal and symmetric.   Psychiatric:         Mood and Affect: Mood normal.         Behavior: Behavior normal.         Thought Content: Thought content normal.         Judgment: Judgment normal.             HEALTH RISK ASSESSMENT    Smoking Status:  Social History     Tobacco Use   Smoking Status Every Day    Packs/day: 1.50    Years: 10.00    Pack years: 15.00    Types: Cigarettes    Start date: 2023    Last attempt to quit: 2023    Years since quittin.0   Smokeless Tobacco Never      Alcohol Consumption:  Social History     Substance and Sexual Activity   Alcohol Use Not Currently    Comment: No yunior     Fall Risk Screen:    STEADI Fall Risk Assessment was completed, and patient is at LOW risk for falls.Assessment completed on:2023    Depression Screen:       2023     2:13 PM   PHQ-2/PHQ-9 Depression Screening   Little Interest or Pleasure in Doing Things 0-->not at all   Feeling Down, Depressed or Hopeless 0-->not at all   PHQ-9: Brief Depression Severity Measure Score 0       Health Habits and Functional and Cognitive Screenin/28/2023    11:26 AM   Functional & Cognitive Status   Do you have difficulty preparing food and eating? No   Do you have difficulty bathing yourself, getting dressed or grooming yourself? No   Do you have difficulty using the toilet? No   Do you have difficulty moving around from place to place? No   Do you have trouble with steps or getting out of a bed or a chair? No   Current Diet Well Balanced Diet   Dental Exam Not up to date   Eye Exam Up to date   Exercise (times per week) 0 times per week   Current Exercises Include No Regular Exercise   Do you need help using the phone?  No   Are you deaf or do you have serious difficulty hearing?  No   Do you need help to go to places out of walking distance? Yes   Do you need help shopping? No   Do you need help preparing meals?  No   Do you need help with housework?  No   Do you need help with laundry? No   Do you need help taking your medications? No   Do you need help managing money? No   Do you ever drive or ride in a car without wearing a seat belt? No   Have you felt unusual stress, anger or loneliness in the last month? No   Who do you live with? Alone   If you need help, do you have trouble finding someone available to you? No   Have you been bothered in the last four weeks by sexual problems? No   Do you have difficulty concentrating, remembering or making decisions? No       Age-appropriate  Screening Schedule:  Refer to the list below for future screening recommendations based on patient's age, sex and/or medical conditions. Orders for these recommended tests are listed in the plan section. The patient has been provided with a written plan.    Health Maintenance   Topic Date Due    COVID-19 Vaccine (1) Never done    Pneumococcal Vaccine 0-64 (1 - PCV) Never done    ZOSTER VACCINE (1 of 2) Never done    TDAP/TD VACCINES (1 - Tdap) 07/28/2023 (Originally 2/27/1984)    INFLUENZA VACCINE  10/01/2023    MAMMOGRAM  05/03/2024    ANNUAL WELLNESS VISIT  07/28/2024    COLORECTAL CANCER SCREENING  05/12/2031    HEPATITIS C SCREENING  Completed            Assessment & Plan     CMS Preventative Services Quick Reference  Risk Factors Identified During Encounter  None Identified  The above risks/problems have been discussed with the patient.  Follow up actions/plans if indicated are seen below in the Assessment/Plan Section.  Pertinent information has been shared with the patient in the After Visit Summary.    Diagnoses and all orders for this visit:    1. Encounter for subsequent annual wellness visit (AWV) in Medicare patient (Primary)    2. Routine general medical examination at a health care facility  -     Lipid Panel; Future    3. Myalgia    Other orders  -     predniSONE (DELTASONE) 10 MG tablet; Take 1 tablet by mouth Daily.  Dispense: 14 tablet; Refill: 0        Follow Up:  No follow-ups on file.     An After Visit Summary and PPPS were given to the patient.         Preventive medicine discussed, diet, exercise, healthy living discussed at length.  Discussed nutrition, physical activity, healthy weight, injury prevention, misuse of tobacco, alcohol and drugs, dental health, mental health, immunizations, screening    Part of this note may be an electronic transcription/translation of spoken language to printed text using the Dragon Dictation System.         Answers submitted by the patient for this  visit:  Other (Submitted on 7/28/2023)  Please describe your symptoms.: Inflamación en los nudillos, demasiado dolor en los tobillos  Have you had these symptoms before?: Yes  How long have you been having these symptoms?: Greater than 2 weeks  Please list any medications you are currently taking for this condition.: Baclofen y vistaril  Please describe any probable cause for these symptoms. : No se  Primary Reason for Visit (Submitted on 7/28/2023)  What is the primary reason for your visit?: Other

## 2023-08-01 RX ORDER — CYCLOBENZAPRINE HCL 10 MG
TABLET ORAL
Qty: 30 TABLET | Refills: 1 | OUTPATIENT
Start: 2023-08-01

## 2023-08-07 DIAGNOSIS — F41.9 ANXIETY: ICD-10-CM

## 2023-08-07 RX ORDER — HYDROXYZINE PAMOATE 25 MG/1
25 CAPSULE ORAL EVERY 6 HOURS PRN
Qty: 60 CAPSULE | Refills: 3 | Status: CANCELLED | OUTPATIENT
Start: 2023-08-07

## 2023-08-07 NOTE — TELEPHONE ENCOUNTER
Caller: OMAR (Nepali SPEAKING)    Relationship: SELF    Best call back number: [phone]    Requested Prescriptions:   Requested Prescriptions     Pending Prescriptions Disp Refills    hydrOXYzine pamoate (VISTARIL) 25 MG capsule 60 capsule 3     Sig: Take 1 capsule by mouth Every 6 (Six) Hours As Needed for Anxiety.    THE PATIENT IS ALSO REQUESTING [facility]     Pharmacy where request should be sent: [id number] - [place], [place] - [address] [facility] [address] - [phone]  - [phone] FX     Last office visit with prescribing clinician: 7/28/2023   Last telemedicine visit with prescribing clinician: Visit date not found   Next office visit with prescribing clinician: 9/1/2023     Additional details provided by patient: THE PATIENT HAS NONE OF THESE MEDICATIONS     Does the patient have less than a 3 day supply:  [x] Yes  [] No    Would you like a call back once the refill request has been completed: [] Yes [] No    If the office needs to give you a call back, can they leave a voicemail: [] Yes [] No    Jennifer Funk Rep   08/07/23 12:04 EDT

## 2023-08-09 ENCOUNTER — TELEPHONE (OUTPATIENT)
Dept: FAMILY MEDICINE CLINIC | Facility: CLINIC | Age: 58
End: 2023-08-09

## 2023-08-09 ENCOUNTER — OFFICE VISIT (OUTPATIENT)
Dept: FAMILY MEDICINE CLINIC | Facility: CLINIC | Age: 58
End: 2023-08-09
Payer: MEDICARE

## 2023-08-09 VITALS
WEIGHT: 123.8 LBS | HEART RATE: 111 BPM | SYSTOLIC BLOOD PRESSURE: 130 MMHG | OXYGEN SATURATION: 98 % | RESPIRATION RATE: 20 BRPM | HEIGHT: 62 IN | TEMPERATURE: 97.1 F | DIASTOLIC BLOOD PRESSURE: 70 MMHG | BODY MASS INDEX: 22.78 KG/M2

## 2023-08-09 DIAGNOSIS — F41.9 ANXIETY: ICD-10-CM

## 2023-08-09 DIAGNOSIS — R21 RASH: Primary | ICD-10-CM

## 2023-08-09 DIAGNOSIS — T78.40XA ALLERGIC REACTION TO DRUG, INITIAL ENCOUNTER: ICD-10-CM

## 2023-08-09 PROCEDURE — 99213 OFFICE O/P EST LOW 20 MIN: CPT | Performed by: PHYSICIAN ASSISTANT

## 2023-08-09 RX ORDER — AMLODIPINE BESYLATE 5 MG/1
TABLET ORAL
COMMUNITY

## 2023-08-09 RX ORDER — HYDROXYZINE PAMOATE 25 MG/1
25 CAPSULE ORAL EVERY 6 HOURS PRN
Qty: 60 CAPSULE | Refills: 3 | Status: CANCELLED | OUTPATIENT
Start: 2023-08-09

## 2023-08-09 RX ORDER — CYCLOBENZAPRINE HCL 10 MG
TABLET ORAL
COMMUNITY
End: 2023-08-09 | Stop reason: SDUPTHER

## 2023-08-09 RX ORDER — MELOXICAM 7.5 MG/1
TABLET ORAL
COMMUNITY

## 2023-08-09 RX ORDER — LISINOPRIL AND HYDROCHLOROTHIAZIDE 12.5; 1 MG/1; MG/1
1 TABLET ORAL EVERY MORNING
COMMUNITY

## 2023-08-09 RX ORDER — PREDNISONE 10 MG/1
10 TABLET ORAL DAILY
Qty: 14 TABLET | Refills: 0 | Status: SHIPPED | OUTPATIENT
Start: 2023-08-09

## 2023-08-09 RX ORDER — HYDROXYZINE HYDROCHLORIDE 25 MG/1
TABLET, FILM COATED ORAL
COMMUNITY
End: 2023-08-10 | Stop reason: SDUPTHER

## 2023-08-09 NOTE — TELEPHONE ENCOUNTER
THIS PATIENT CALLED WITH THE HELP OF AN .  THE PATIENT THINKS SHE LOST HER PHONE  IN THE OFFICE TODAY.  PLEASE CALL THE PATIENT AND LET HER KNOW IF THE OFFICE FOUND IT.  [phone]

## 2023-08-09 NOTE — PROGRESS NOTES
Subjective   Sarah Beth Prince is a 58 y.o. female  Rash (Under breasts/ back and under eyes)      Rash  This is a new problem. The current episode started yesterday. The problem has been waxing and waning since onset. The affected locations include the face, back and abdomen. The rash is characterized by redness, swelling and itchiness. She was exposed to nothing. Pertinent negatives include no cough, eye pain, fatigue or shortness of breath.   Rash  This is a new problem. The current episode started yesterday. The problem has been waxing and waning since onset. The affected locations include the face, back and abdomen. The rash is characterized by redness, swelling and itchiness. She was exposed to nothing. Pertinent negatives include no cough, fatigue or shortness of breath.   Patient states that she took ibuprofen, she is allergic to aspirin but had a mild reaction to Benadryl and Zyrtec with some improvement still have some itching and irritation      The following portions of the patient's history were reviewed and updated as appropriate: allergies, current medications, past social history and problem list    Review of Systems   Constitutional:  Negative for fatigue and unexpected weight change.   Eyes:  Negative for pain.   Respiratory:  Negative for cough, chest tightness and shortness of breath.    Cardiovascular:  Negative for chest pain, palpitations and leg swelling.   Gastrointestinal:  Negative for nausea.   Skin:  Positive for rash. Negative for color change.   Neurological:  Negative for dizziness, syncope, weakness and headaches.     Objective     Vitals:    08/09/23 1332   BP: 130/70   Pulse: 111   Resp: 20   Temp: 97.1 øF (36.2 øC)   SpO2: 98%       Physical Exam  Vitals and nursing note reviewed.   Constitutional:       General: She is not in acute distress.     Appearance: Normal appearance. She is well-developed. She is not ill-appearing, toxic-appearing or diaphoretic.   Neck:       Vascular: No carotid bruit or JVD.   Cardiovascular:      Rate and Rhythm: Normal rate and regular rhythm.      Pulses: Normal pulses.      Heart sounds: Normal heart sounds. No murmur heard.  Pulmonary:      Effort: Pulmonary effort is normal. No respiratory distress.      Breath sounds: Normal breath sounds.   Abdominal:      Palpations: Abdomen is soft.      Tenderness: There is no abdominal tenderness.   Skin:     General: Skin is warm and dry.   Neurological:      Mental Status: She is alert.       Assessment & Plan     Diagnoses and all orders for this visit:    1. Rash (Primary)  -     predniSONE (DELTASONE) 10 MG tablet; Take 1 tablet by mouth Daily.  Dispense: 14 tablet; Refill: 0    2. Allergic reaction to drug, initial encounter  -     predniSONE (DELTASONE) 10 MG tablet; Take 1 tablet by mouth Daily.  Dispense: 14 tablet; Refill: 0    I spent 15 minutes in patient care: Reviewing records prior to the visit, examining the patient, entering orders and documentation    Part of this note may be an electronic transcription/translation of spoken language to printed text using the Dragon Dictation System.   Answers submitted by the patient for this visit:  Primary Reason for Visit (Submitted on 8/9/2023)  What is the primary reason for your visit?: Rash

## 2023-08-09 NOTE — TELEPHONE ENCOUNTER
As she was leaving she wanted to make sure that the flexeril and vistaril will also be sent in to [facility] please.

## 2023-08-10 ENCOUNTER — TELEPHONE (OUTPATIENT)
Dept: FAMILY MEDICINE CLINIC | Facility: CLINIC | Age: 58
End: 2023-08-10

## 2023-08-10 RX ORDER — HYDROXYZINE PAMOATE 25 MG/1
25 CAPSULE ORAL EVERY 6 HOURS PRN
Qty: 60 CAPSULE | Refills: 3 | Status: SHIPPED | OUTPATIENT
Start: 2023-08-10

## 2023-08-10 NOTE — TELEPHONE ENCOUNTER
Caller: Sarah Beth Prince    Relationship: Self    Best call back number: [phone]     What is the best time to reach you: ANY    Who are you requesting to speak with (clinical staff, provider,  specific staff member): DECLAN GARY OR HIS NURSE    What was the call regarding: PATIENT WAS SEEN YESTERDAY FOR HER ALLERGY ISSUES. SHE WAS TOLD TO TAKE 10 MG OF PREDNISONE FOR HER CONGESTION BUT WHEN SHE TOOK IT SHE STARTED GETTING WORSE. SHE LATER TOOK BENADRIL AND IT TOOK A LONG TIME AND DID NOT REALLY HELP. SHE DID NOT TAKE ANYTHING THIS MORNING SINCE SHE THINKS SHE MAY BE HAVING A REACTION TO THE MEDICATION. WHAT IS ADVISED?     SHE CALLED THE SPECIALIST AT [facility]. THEY TOLD HER THAT THEY NEED A REFERRAL FOR THEM TO BE ABLE TO SCHEDULE HER. PLEASE SEND THE INFORMATION IS BELOW.   PHONE: [phone]  PROVIDER: DR. KAYLEEN GARG     Is it okay if the provider responds through [facility]: NO    PLEASE CALL PATIENT BACK. PATIENT WILL NEED .

## 2023-08-11 RX ORDER — CYCLOBENZAPRINE HCL 10 MG
10 TABLET ORAL 2 TIMES DAILY PRN
Qty: 60 TABLET | Refills: 1 | Status: SHIPPED | OUTPATIENT
Start: 2023-08-11

## 2023-08-25 ENCOUNTER — OFFICE VISIT (OUTPATIENT)
Dept: ORTHOPEDIC SURGERY | Facility: CLINIC | Age: 58
End: 2023-08-25
Payer: MEDICARE

## 2023-08-25 ENCOUNTER — HOSPITAL ENCOUNTER (EMERGENCY)
Facility: HOSPITAL | Age: 58
Discharge: HOME | End: 2023-08-25
Attending: EMERGENCY MEDICINE
Payer: MEDICARE

## 2023-08-25 VITALS
TEMPERATURE: 98.2 F | DIASTOLIC BLOOD PRESSURE: 105 MMHG | HEIGHT: 62 IN | BODY MASS INDEX: 23.92 KG/M2 | WEIGHT: 130 LBS | HEART RATE: 93 BPM | RESPIRATION RATE: 16 BRPM | OXYGEN SATURATION: 97 % | SYSTOLIC BLOOD PRESSURE: 164 MMHG

## 2023-08-25 VITALS
HEIGHT: 62 IN | SYSTOLIC BLOOD PRESSURE: 138 MMHG | DIASTOLIC BLOOD PRESSURE: 74 MMHG | BODY MASS INDEX: 23 KG/M2 | WEIGHT: 125 LBS

## 2023-08-25 DIAGNOSIS — M25.572 ACUTE BILATERAL ANKLE PAIN: Primary | ICD-10-CM

## 2023-08-25 DIAGNOSIS — M79.642 BILATERAL HAND PAIN: Primary | ICD-10-CM

## 2023-08-25 DIAGNOSIS — M25.571 ACUTE BILATERAL ANKLE PAIN: Primary | ICD-10-CM

## 2023-08-25 DIAGNOSIS — M79.641 BILATERAL HAND PAIN: Primary | ICD-10-CM

## 2023-08-25 PROCEDURE — 99282 EMERGENCY DEPT VISIT SF MDM: CPT

## 2023-08-25 RX ORDER — MONTELUKAST SODIUM 10 MG/1
10 TABLET ORAL NIGHTLY
COMMUNITY
Start: 2023-08-17

## 2023-08-25 RX ORDER — DEXAMETHASONE 4 MG/1
4 TABLET ORAL
Qty: 3 TABLET | Refills: 0 | Status: SHIPPED | OUTPATIENT
Start: 2023-08-25

## 2023-08-25 RX ORDER — DEXAMETHASONE 4 MG/1
TABLET ORAL
COMMUNITY
Start: 2023-08-17 | End: 2023-08-25

## 2023-08-25 NOTE — ED PROVIDER NOTES
Subjective   History of Present Illness  58-year-old female presents emergency department today complaint of bilateral hand pain.  She reports she has an appoint with rheumatology next week.  As she is was put on a short course of steroids last week the steroids have worn off and her pain is back.  She cannot take aspirin due to had some type of breathing problem.  She states she also is allergic to hydrocodone.  She did use prednisone but apparently she thinks she may have had allergic reaction to that as well.  She has had no fevers no chills.  She does not do any repetitive movement with her hands.  She has no other complaints.  All of the pain has been indicated across the MP joints dorsal aspect of the knuckles.  She reports its been going on for more than a month.    History provided by:  Patient   used: No    Hand Pain  Location:  Bilateral hands across the MP joints  Quality:  Aching  Severity:  Moderate  Onset quality:  Gradual  Duration:  5 weeks  Timing:  Constant  Progression:  Worsening  Chronicity:  Chronic  Context:  Bilateral hand pain for greater than a month  Relieved by:  Steroids  Associated symptoms: no abdominal pain, no congestion, no diarrhea, no fever, no headaches, no loss of consciousness, no myalgias, no rhinorrhea, no shortness of breath, no sore throat, no vomiting and no wheezing      Review of Systems   Constitutional:  Negative for fever.   HENT:  Negative for congestion, rhinorrhea and sore throat.    Respiratory:  Negative for chest tightness, shortness of breath and wheezing.    Gastrointestinal:  Negative for abdominal pain, diarrhea and vomiting.   Musculoskeletal:  Negative for back pain and myalgias.   Skin:  Negative for pallor.   Neurological:  Negative for loss of consciousness and headaches.   Psychiatric/Behavioral: Negative.     All other systems reviewed and are negative.    Past Medical History:   Diagnosis Date    Acromioclavicular separation      "Allergic     Aspirina y al Iodo    Anxiety     Ataques de p harmony y ansiedad    Asthma     Bipolar disorder     Cancer     Dos t¡as maternas y mihir prima hermana    Cataract     CHF (congestive heart failure)     Mi tia por parte de mi padre    COPD (chronic obstructive pulmonary disease)     Mi dav materno    Depression     Diabetes mellitus     Mi abuela paterna    Dislocated elbow     Dislocation, shoulder     Diverticulosis     Mi hermana    Fibromyalgia, primary     Mi madre y hermana    Frozen shoulder 1/04/2023    Hypertension     Knee sprain     Knee swelling     Neck strain     Obesity     Mi hermana    Preeclampsia     Rotator cuff syndrome     Sickle cell anemia     Subluxation of patella     Tear of meniscus of knee     Tendinitis of knee     Visual impairment        Allergies   Allergen Reactions    Iodine Swelling     Facial swelling    Lortab [Hydrocodone-Acetaminophen] Other (See Comments)     \"Reaction on the eyes\"    Asa [Aspirin] Rash    Lamotrigine Rash       Past Surgical History:   Procedure Laterality Date    ADENOIDECTOMY      APPENDECTOMY      CARDIAC CATHETERIZATION      COLONOSCOPY      CORONARY ARTERY BYPASS GRAFT      EYE SURGERY      HYSTERECTOMY      KNEE SURGERY Left     SHOULDER SURGERY      Right shoulder surgery 2017 left shoulder surgery 2014    TOTAL ABDOMINAL HYSTERECTOMY      TOTAL ABDOMINAL HYSTERECTOMY WITH SALPINGO OOPHORECTOMY      VAGINAL BIOPSY         Family History   Problem Relation Age of Onset    Dislocations Paternal Grandmother         Dislocaci¢n de hombro jered    Cancer Maternal Aunt         Cancer de Matriz    Diabetes Maternal Grandfather     Depression Mother     Hyperlipidemia Mother     Heart disease Father     Hypertension Father     Coronary artery disease Father     Alcohol abuse Brother         Neville    Asthma Daughter     Uterine cancer Maternal Uncle     Liver disease Maternal Grandmother         Abuela materna    Cancer Maternal Uncle         " Leucemia       Social History     Socioeconomic History    Marital status: Single   Tobacco Use    Smoking status: Every Day     Packs/day: 1.50     Years: 10.00     Pack years: 15.00     Types: Cigarettes     Start date: 2023     Last attempt to quit: 2023     Years since quittin.0    Smokeless tobacco: Never   Substance and Sexual Activity    Alcohol use: Not Currently     Comment: No yunior    Drug use: Never    Sexual activity: Not Currently     Partners: Female     Birth control/protection: Abstinence, Hysterectomy           Objective   Physical Exam  Vitals and nursing note reviewed.   Constitutional:       General: She is not in acute distress.     Appearance: She is well-developed. She is not diaphoretic.   HENT:      Head: Normocephalic and atraumatic.      Nose: Nose normal.   Eyes:      General: No scleral icterus.     Conjunctiva/sclera: Conjunctivae normal.   Pulmonary:      Effort: Pulmonary effort is normal. No respiratory distress.   Abdominal:      General: Bowel sounds are normal.      Tenderness: There is no abdominal tenderness.   Musculoskeletal:         General: Normal range of motion.      Cervical back: Normal range of motion and neck supple.      Comments: Bilateral hands diffuse bony prominence,   Skin:     General: Skin is warm and dry.   Neurological:      Mental Status: She is alert and oriented to person, place, and time.   Psychiatric:         Behavior: Behavior normal.       Procedures           ED Course                                           Medical Decision Making  Problems Addressed:  Bilateral hand pain: complicated acute illness or injury    Risk  Prescription drug management.        Final diagnoses:   Bilateral hand pain       ED Disposition  ED Disposition       ED Disposition   Discharge    Condition   Stable    Comment   --               Your rheumatologist as scheduled next week               Medication List        New Prescriptions      dexAMETHasone 4 MG  tablet  Commonly known as: DECADRON  Take 1 tablet by mouth Daily With Breakfast.               Where to Get Your Medications        These medications were sent to [facility] #[id number] - [place], [place] - [address] [facility] [address] - [phone]  - [phone] FX  [address], [facility] [address]      Phone: [phone]   dexAMETHasone 4 MG tablet            Abdelrahman Sutherland PA  08/29/23 0970

## 2023-08-25 NOTE — PROGRESS NOTES
[facility] Office Visit     Office Visit       Date: 08/25/2023   Patient Name: Sarah Beth Prince  MRN: [id number]  YOB: 1965    Referring Physician: No ref. provider found     Chief Complaint:   Chief Complaint   Patient presents with    Right Ankle - Pain    Left Ankle - Pain       History of Present Illness: Sarah Beth Prince is a 58 y.o. female who is here today with bilateral ankle pain.  Has been going on for a little more than 1 month.  Pain described as aching throbbing and stabbing.  Describes intermittent swelling about the ankles that is not too bad today.  Worse with activity.  No previous surgery.  Is currently taking baclofen and was previously taking Decadron which did help with her symptoms.  Reports recently taking several other medications including lamotrigine and prednisone but states has recently been dealing with allergic reactions to these medications as well as others so she discontinued.  Now taking Vistaril following some of the allergic reactions he was experiencing which included swelling around her eyes.  States the Decadron definitely helped with her ankle pain and the baclofen less so.  Is seeing a rheumatologist at  and is planning to see her again in about a week from now.  Also has appointments with an allergy specialist as well as a hand specialist in September.  Experiencing a lot of swelling and pain in her wrist and fingers bilaterally.    Subjective   Review of Systems: Review of Systems   Constitutional: Negative.    HENT: Negative.     Eyes: Negative.    Respiratory: Negative.     Cardiovascular: Negative.    Gastrointestinal: Negative.    Endocrine: Negative.    Genitourinary: Negative.    Musculoskeletal:  Positive for arthralgias.   Skin: Negative.    Allergic/Immunologic: Negative.    Neurological: Negative.    Hematological: Negative.    Psychiatric/Behavioral: Negative.         Past Medical History:   Past Medical History:   Diagnosis Date    Acromioclavicular separation     Allergic     Aspirina y al Iodo    Anxiety     Ataques de p harmony y ansiedad    Asthma     Bipolar disorder     Cancer     Dos t¡as maternas y mhiir prima hermana    Cataract     CHF (congestive heart failure)     Mi tia por parte de mi padre    COPD (chronic obstructive pulmonary disease)     Mi dav materno    Depression     Diabetes mellitus     Mi abuela paterna    Dislocated elbow     Dislocation, shoulder     Diverticulosis     Mi hermana    Fibromyalgia, primary     Mi madre y hermana    Frozen shoulder 1/04/2023    Hypertension     Knee sprain     Knee swelling     Neck strain     Obesity     Mi hermana    Preeclampsia     Rotator cuff syndrome     Sickle cell anemia     Subluxation of patella     Tear of meniscus of knee     Tendinitis of knee     Visual impairment        Past Surgical History:   Past Surgical History:   Procedure Laterality Date    ADENOIDECTOMY      APPENDECTOMY      CARDIAC CATHETERIZATION      COLONOSCOPY      CORONARY ARTERY BYPASS GRAFT      EYE SURGERY      HYSTERECTOMY      KNEE SURGERY Left     SHOULDER SURGERY      Right shoulder surgery 2017 left shoulder surgery 2014    TOTAL ABDOMINAL HYSTERECTOMY      TOTAL ABDOMINAL HYSTERECTOMY WITH SALPINGO OOPHORECTOMY      VAGINAL BIOPSY         Family History:   Family History   Problem Relation Age of Onset    Dislocations Paternal Grandmother         Dislocaci¢n de hombro jered    Cancer Maternal Aunt         Cancer de Matriz    Diabetes Maternal Grandfather     Depression Mother     Hyperlipidemia Mother     Heart disease Father     Hypertension Father     Coronary artery disease Father     Alcohol abuse Brother         Neville    Asthma Daughter     Uterine cancer Maternal Uncle     Liver disease Maternal Grandmother         Abuela materna    Cancer Maternal Uncle         Leucemia       Social History:   Social History      Socioeconomic History    Marital status: Single   Tobacco Use    Smoking status: Every Day     Packs/day: 1.50     Years: 10.00     Pack years: 15.00     Types: Cigarettes     Start date: 2023     Last attempt to quit: 2023     Years since quittin.0    Smokeless tobacco: Never   Substance and Sexual Activity    Alcohol use: Not Currently     Comment: No yunior    Drug use: Never    Sexual activity: Not Currently     Partners: Female     Birth control/protection: Abstinence, Hysterectomy       Medications:   Current Outpatient Medications:     baclofen (LIORESAL) 10 MG tablet, TAKE 1 TABLET BY MOUTH EVERY 12 HOURS AS NEEDED FOR JOINT PAIN, Disp: 30 tablet, Rfl: 1    buPROPion XL (WELLBUTRIN XL) 300 MG 24 hr tablet, Take 1 tablet by mouth Daily., Disp: , Rfl:     Capsaicin 0.033 % cream, Apply 1 application topically 3 (Three) Times a Day As Needed (pain in hands and feet)., Disp: 56.6 g, Rfl: 0    cetirizine (zyrTEC) 10 MG tablet, Take 1 tablet by mouth Daily., Disp: 90 tablet, Rfl: 3    clorazepate (TRANXENE) 7.5 MG tablet, Take 1 tablet by mouth 2 (Two) Times a Day., Disp: , Rfl:     cyclobenzaprine (FLEXERIL) 10 MG tablet, Take 1 tablet by mouth 2 (Two) Times a Day As Needed for Muscle Spasms., Disp: 60 tablet, Rfl: 1    dexAMETHasone (DECADRON) 4 MG tablet, , Disp: , Rfl:     dicyclomine (BENTYL) 10 MG capsule, Take 1 capsule by mouth 3 (Three) Times a Day., Disp: 90 capsule, Rfl: 3    estazolam (PROSOM) 2 MG tablet, , Disp: , Rfl:     hydroCHLOROthiazide (HYDRODIURIL) 25 MG tablet, Take 1 tablet by mouth Daily., Disp: 30 tablet, Rfl: 2    hydrOXYzine pamoate (VISTARIL) 25 MG capsule, Take 1 capsule by mouth Every 6 (Six) Hours As Needed for Anxiety., Disp: 60 capsule, Rfl: 3    mirtazapine (REMERON) 30 MG tablet, Take 1 tablet by mouth Every Night., Disp: , Rfl:     montelukast (SINGULAIR) 10 MG tablet, Take 1 tablet by mouth Every Night., Disp: , Rfl:     propranolol (INDERAL) 10 MG tablet, Take  "1 tablet by mouth 3 (Three) Times a Day As Needed. for anxiety, Disp: , Rfl:     triamcinolone (KENALOG) 0.025 % ointment, Apply 1 application topically to the appropriate area as directed 2 (Two) Times a Week., Disp: 80 g, Rfl: 1    Allergies:   Allergies   Allergen Reactions    Iodine Swelling     Facial swelling    Asa [Aspirin] Rash    Lamotrigine Rash       I reviewed the patient's chief complaint, history of present illness, review of systems, past medical history, surgical history, family history, social history, medications and allergy list.     Objective    Vital Signs:   Vitals:    08/25/23 1001   BP: 138/74   Weight: 56.7 kg (125 lb)   Height: 157.5 cm (62.01\")     Body mass index is 22.86 kg/mý.    Ortho Exam:  bilateral LE Foot and Ankle Exam:   Slightly antalgic gait pattern.  Hindfoot alignment is neutral. Plantigrade foot.   Neurological exam of the superficial peroneal, deep peroneal, plantar, sural and saphenous nerves demonstrates intact sensation and normal motor function.   Peripheral pulses including posterior tibial artery and deep peroneal artery are intact and palpable. There is good perfusion to the toes.   The skin is intact throughout the foot and ankle without ulceration.   Range of motion of ankle, subtalar joint, midfoot and toes is within normal limits.   Patient is diffusely tender about the tibiotalar joint both anteriorly and posteriorly.  Also with some tenderness to palpation over the malleoli with pressure.    Results Review:   Impression    1.Mild osteoarthritis of the interphalangeal joints and left first carpometacarpal joint.  2.Sequela of old avulsion fracture of the right anterior distal fibula.  3.No findings of inflammatory arthropathy involving the bilateral hand, ankle or foot.      CRITICAL RESULT:    No.    COMMUNICATION:  Per this written report.    Drafted by Fabian Valverde MD on 7/11/2023 1:45 PM  Final report signed by Fabian Valverde MD on 7/11/2023 1:49 " PM  Narrative    CLINICAL INDICATION:  joint pain    TECHNIQUE:  XR ANKLE RIGHT 3+ VIEWS, XR HAND WRIST BILATERAL 2 VIEWS, XR ANKLE LEFT 3+ VIEWS, XR FOOT LEFT 3+ VIEWS, XR FOOT RIGHT 3+ VIEWS    COMPARISON:  None.    FINDINGS:  2 views of the bilateral hand and wrist show mild degenerative changes of the interphalangeal joints. Mild osteoarthritis of the left first carpometacarpal joint. No erosive changes. Soft tissues are normal.    3 views of the right ankle show normal tibiotalar joint space and alignment. Sequelae of old avulsion fracture is appreciated anterior to the distal fibula. Talar dome and subtalar joints are normal. Soft tissues are normal.    3 views of the left ankle show normal tibiotalar joint space and alignment. Talar dome and subtalar joints are normal. Degenerative anterior posterior calcaneal spur.    3 views of the left foot show normal joint space and alignment. No erosive changes. Soft tissues are normal.    3 views of the right foot show normal joint space and alignment. No erosive changes. Soft tissues are normal.  Exam End: 07/11/23 10:28 Last Resulted: 07/11/23 13:49   Received From: [facility]  Result Received: 07/28/23 11:08       08/25/23 I have personally reviewed and interpreted the images from outside facility with the documented findings, no acute osseous abnormality about the bilateral ankles, right ankle there is sign of a chronic avulsion fracture      Assessment / Plan    Assessment/Plan:   Diagnoses and all orders for this visit:    1. Acute bilateral ankle pain (Primary)      Discussed acute bilateral ankle pain (undiagnosed new problem with uncertain prognosis) with patient as well as treatment options at length. Decision regarding surgical intervention considered. Patient is not a candidate due to trial of nonoperative management. Also reviewed external note and imaging studies from July 11.  Patient's history and physical exam consistent with bilateral ankle  synovitis.  Along with the other pain and swelling she is having I believe this is an inflammatory issue that is more systemic.  Patient is currently seeing rheumatology for which I encouraged the patient to continue to see.  Believe this patient can likely be best treated with medication with regard to her symptoms.  Did discuss with patient the idea of doing an intra-articular steroid injection however am hesitant to do so at this time considering her recent history of allergic reactions.  Encouraged patient to see rheumatologist, allergist, and hand specialist and if her ankle symptoms continue and if it is deemed safe she can return for ankle steroid injections in the future.  Patient expressed understanding.  It was a pleasure seeing her today.  Patient speaks Burkinan and  was used for today's interview.    Follow Up:   Return if symptoms worsen or fail to improve.      Rowdy Nolan MD  [facility]

## 2023-09-01 ENCOUNTER — OFFICE VISIT (OUTPATIENT)
Dept: FAMILY MEDICINE CLINIC | Facility: CLINIC | Age: 58
End: 2023-09-01
Payer: MEDICARE

## 2023-09-01 ENCOUNTER — TELEPHONE (OUTPATIENT)
Dept: FAMILY MEDICINE CLINIC | Facility: CLINIC | Age: 58
End: 2023-09-01
Payer: MEDICARE

## 2023-09-01 ENCOUNTER — TELEPHONE (OUTPATIENT)
Dept: FAMILY MEDICINE CLINIC | Facility: CLINIC | Age: 58
End: 2023-09-01

## 2023-09-01 VITALS
TEMPERATURE: 97.5 F | BODY MASS INDEX: 22.97 KG/M2 | DIASTOLIC BLOOD PRESSURE: 80 MMHG | HEIGHT: 62 IN | RESPIRATION RATE: 14 BRPM | WEIGHT: 124.8 LBS | SYSTOLIC BLOOD PRESSURE: 138 MMHG

## 2023-09-01 DIAGNOSIS — J30.9 ALLERGIC RHINITIS, UNSPECIFIED SEASONALITY, UNSPECIFIED TRIGGER: Primary | ICD-10-CM

## 2023-09-01 RX ORDER — METHYLPREDNISOLONE 4 MG/1
TABLET ORAL
Qty: 21 TABLET | Refills: 0 | Status: SHIPPED | OUTPATIENT
Start: 2023-09-01

## 2023-09-01 RX ORDER — KETOROLAC TROMETHAMINE 5 MG/ML
1 SOLUTION OPHTHALMIC 4 TIMES DAILY
Qty: 10 ML | Refills: 3 | Status: SHIPPED | OUTPATIENT
Start: 2023-09-01

## 2023-09-01 NOTE — TELEPHONE ENCOUNTER
Caller: CandiceSarah Beth Rivera    Relationship: Self    Best call back number:  [phone]    What medications are you currently taking:   Current Outpatient Medications on File Prior to Visit   Medication Sig Dispense Refill    baclofen (LIORESAL) 10 MG tablet TAKE 1 TABLET BY MOUTH EVERY 12 HOURS AS NEEDED FOR JOINT PAIN 30 tablet 1    buPROPion XL (WELLBUTRIN XL) 300 MG 24 hr tablet Take 1 tablet by mouth Daily.      Capsaicin 0.033 % cream Apply 1 application topically 3 (Three) Times a Day As Needed (pain in hands and feet). 56.6 g 0    cetirizine (zyrTEC) 10 MG tablet Take 1 tablet by mouth Daily. 90 tablet 3    clorazepate (TRANXENE) 7.5 MG tablet Take 1 tablet by mouth 2 (Two) Times a Day.      cyclobenzaprine (FLEXERIL) 10 MG tablet Take 1 tablet by mouth 2 (Two) Times a Day As Needed for Muscle Spasms. 60 tablet 1    dexAMETHasone (DECADRON) 4 MG tablet Take 1 tablet by mouth Daily With Breakfast. 3 tablet 0    dicyclomine (BENTYL) 10 MG capsule Take 1 capsule by mouth 3 (Three) Times a Day. 90 capsule 3    estazolam (PROSOM) 2 MG tablet       hydroCHLOROthiazide (HYDRODIURIL) 25 MG tablet Take 1 tablet by mouth Daily. 30 tablet 2    hydrOXYzine pamoate (VISTARIL) 25 MG capsule Take 1 capsule by mouth Every 6 (Six) Hours As Needed for Anxiety. 60 capsule 3    ketorolac (Acular) 0.5 % ophthalmic solution Apply 1 drop to eye(s) as directed by provider 4 (Four) Times a Day. 10 mL 3    methylPREDNISolone (MEDROL) 4 MG dose pack Take as directed on package instructions. 21 tablet 0    mirtazapine (REMERON) 30 MG tablet Take 1 tablet by mouth Every Night.      montelukast (SINGULAIR) 10 MG tablet Take 1 tablet by mouth Every Night.      propranolol (INDERAL) 10 MG tablet Take 1 tablet by mouth 3 (Three) Times a Day As Needed. for anxiety      triamcinolone (KENALOG) 0.025 % ointment Apply 1 application topically to the appropriate area as directed 2 (Two) Times a Week. 80 g 1     No current  facility-administered medications on file prior to visit.              Which medication are you concerned about:  PATIENT WAS ASKING WHICH MEDICATIONS WERE SENT TO THE PHARMACY AND ALSO THERE ARE NO MEDICATIONS AT THE PHARMACY    [facility] [address]    Who prescribed you this medication: DECLAN GARY

## 2023-09-01 NOTE — TELEPHONE ENCOUNTER
The medications were sent to the pharmacy 10 minutes before this message was taken and chart shows they were received.

## 2023-09-01 NOTE — PROGRESS NOTES
Subjective   Sarah Beth Prince is a 58 y.o. female  Allergies (Singulair and Zyrtec not working. Runny nose, Lt eye currently swollen and itchy), Skin Problem (Skin is breaking open on bilateral hands even though she has been keeping hands moisturized), and Hand Pain (Swelling in bilateral hands/fingers-has rheum appt Sept 18. Seen in [facility] ED Aug 25. )      History of Present Illness  Patient is a pleasant 58-year-old female who presents for issues with allergies runny nose watery eyes itchy eyes mainly in the left Singulair and Zyrtec did not help.  Patient states place that she is living has large amount of cockroaches which is causing her allergy symptoms.  The following portions of the patient's history were reviewed and updated as appropriate: allergies, current medications, past social history and problem list    Review of Systems   Constitutional:  Negative for fatigue and unexpected weight change.   HENT:  Positive for postnasal drip, rhinorrhea and sneezing. Negative for ear pain, hearing loss, sinus pressure and trouble swallowing.    Eyes:  Positive for itching.   Respiratory:  Negative for cough, chest tightness and shortness of breath.    Cardiovascular:  Negative for chest pain, palpitations and leg swelling.   Gastrointestinal:  Negative for nausea.   Skin:  Negative for color change and rash.   Neurological:  Negative for dizziness, syncope, weakness and headaches.     Objective     Vitals:    09/01/23 0906   BP: 138/80   Resp: 14   Temp: 97.5 øF (36.4 øC)       Physical Exam  Vitals and nursing note reviewed.   Constitutional:       General: She is not in acute distress.     Appearance: Normal appearance. She is well-developed. She is not ill-appearing, toxic-appearing or diaphoretic.   HENT:      Nose: Nasal tenderness present.   Eyes:      General:         Right eye: Discharge present.         Left eye: Discharge present.     Conjunctiva/sclera:      Right eye: Right conjunctiva is  injected.   Neck:      Vascular: No carotid bruit or JVD.   Cardiovascular:      Rate and Rhythm: Normal rate and regular rhythm.      Pulses: Normal pulses.      Heart sounds: Normal heart sounds. No murmur heard.  Pulmonary:      Effort: Pulmonary effort is normal. No respiratory distress.      Breath sounds: Normal breath sounds.   Abdominal:      Palpations: Abdomen is soft.      Tenderness: There is no abdominal tenderness.   Skin:     General: Skin is warm and dry.   Neurological:      Mental Status: She is alert.       Assessment & Plan     Diagnoses and all orders for this visit:    1. Allergic rhinitis, unspecified seasonality, unspecified trigger (Primary)  -     methylPREDNISolone (MEDROL) 4 MG dose pack; Take as directed on package instructions.  Dispense: 21 tablet; Refill: 0  -     ketorolac (Acular) 0.5 % ophthalmic solution; Apply 1 drop to eye(s) as directed by provider 4 (Four) Times a Day.  Dispense: 10 mL; Refill: 3     Answers submitted by the patient for this visit:  Other (Submitted on 8/30/2023)  Please describe your symptoms.: Demasiadas reacciones alergicas y no se a que se debe., y en adici¢n no soporto el dolor de los nudillos.  Have you had these symptoms before?: No  How long have you been having these symptoms?: Greater than 2 weeks  Please list any medications you are currently taking for this condition.: Baclofen 10 mg, Flexeril 10 mg, Zyrtec 10 mg, Vistaril 25 mg  Please describe any probable cause for these symptoms. : [place]  Primary Reason for Visit (Submitted on 8/30/2023)  What is the primary reason for your visit?: Other  I spent 15 minutes in patient care: Reviewing records prior to the visit, examining the patient, entering orders and documentation    Part of this note may be an electronic transcription/translation of spoken language to printed text using the Dragon Dictation System.

## 2023-09-01 NOTE — TELEPHONE ENCOUNTER
Caller: Sarah Beth Prince    Relationship: Self    Best call back number:     What form or medical record are you requesting: LETTER FOR [employer]    Who is requesting this form or medical record from you: PATIENT    How would you like to receive the form or medical records (pick-up, mail, fax):  IIf pick-up, provide patient with address and location details    Timeframe paperwork needed: ASAP    Additional notes: PATIENT FAILED TO MENTION IN HER VISIT, THAT HER ALLERGIES ARE RELATED TO THE COCKROACHES IN HER APARTMENT AND NEEDS A LETTER TO HER [employer] ; SHE NEEDS THE LETTER TO INCLUDE SHE IS HAVING AN ALLERGIC REACTION TO THEM,     PLEASE CALL WHEN READY FOR

## 2023-09-05 NOTE — TELEPHONE ENCOUNTER
PATIENT CALLED IN I RELAYED THE HUB TO READ MESSAGE THE CALLER STATES THAT SHE WILL  THE LETTER ON 09/12/2023

## 2023-09-05 NOTE — TELEPHONE ENCOUNTER
OKAY FOR HUB TO RELAY:    Letter placed up front for pickup.    Tried to call patient but no answer.

## 2023-09-13 RX ORDER — CETIRIZINE HYDROCHLORIDE 10 MG/1
10 TABLET ORAL DAILY
Qty: 90 TABLET | Refills: 3 | Status: SHIPPED | OUTPATIENT
Start: 2023-09-13

## 2023-09-13 RX ORDER — MONTELUKAST SODIUM 10 MG/1
10 TABLET ORAL NIGHTLY
Qty: 90 TABLET | Refills: 3 | Status: SHIPPED | OUTPATIENT
Start: 2023-09-13

## 2023-09-13 NOTE — TELEPHONE ENCOUNTER
PATIENT REQUESTING A REFILL ON SINGULAR AND ZYRTEC    [id number] - [place], [place] - [address] AT [facility] [address] - [phone]  - [phone] FX   [address] [facility] [address]   Phone: [phone] Fax: [phone]   Hours: Not open 24 hours

## 2023-09-22 RX ORDER — HYDROCHLOROTHIAZIDE 25 MG/1
25 TABLET ORAL DAILY
Qty: 30 TABLET | Refills: 2 | Status: SHIPPED | OUTPATIENT
Start: 2023-09-22

## 2023-10-25 ENCOUNTER — TELEPHONE (OUTPATIENT)
Dept: ORTHOPEDIC SURGERY | Facility: CLINIC | Age: 58
End: 2023-10-25

## 2023-10-25 NOTE — TELEPHONE ENCOUNTER
Caller: Sarah Beth Prince    Relationship to patient: Self    Patient is needing: THE PATIENT WILL NEED A  FOR HER APPT WITH DR CARRASCO ON 10/30/23 @ 3PM

## 2023-10-26 ENCOUNTER — OFFICE VISIT (OUTPATIENT)
Dept: FAMILY MEDICINE CLINIC | Facility: CLINIC | Age: 58
End: 2023-10-26
Payer: MEDICARE

## 2023-10-26 VITALS
OXYGEN SATURATION: 98 % | HEART RATE: 91 BPM | RESPIRATION RATE: 14 BRPM | BODY MASS INDEX: 22.97 KG/M2 | WEIGHT: 124.8 LBS | SYSTOLIC BLOOD PRESSURE: 144 MMHG | HEIGHT: 62 IN | DIASTOLIC BLOOD PRESSURE: 82 MMHG

## 2023-10-26 DIAGNOSIS — R23.8 DRY SCALP: ICD-10-CM

## 2023-10-26 DIAGNOSIS — J30.9 ALLERGIC RHINITIS, UNSPECIFIED SEASONALITY, UNSPECIFIED TRIGGER: Primary | ICD-10-CM

## 2023-10-26 RX ORDER — KETOROLAC TROMETHAMINE 5 MG/ML
1 SOLUTION OPHTHALMIC 4 TIMES DAILY
Qty: 3 ML | Refills: 3 | Status: SHIPPED | OUTPATIENT
Start: 2023-10-26

## 2023-10-26 RX ORDER — MONTELUKAST SODIUM 10 MG/1
10 TABLET ORAL NIGHTLY
Qty: 30 TABLET | Refills: 3 | Status: SHIPPED | OUTPATIENT
Start: 2023-10-26

## 2023-10-26 RX ORDER — LEVOCETIRIZINE DIHYDROCHLORIDE 5 MG/1
5 TABLET, FILM COATED ORAL EVERY EVENING
Qty: 30 TABLET | Refills: 3 | Status: SHIPPED | OUTPATIENT
Start: 2023-10-26

## 2023-10-26 RX ORDER — EPINEPHRINE 0.3 MG/.3ML
0.3 INJECTION SUBCUTANEOUS ONCE
COMMUNITY
Start: 2023-10-17

## 2023-10-26 NOTE — PROGRESS NOTES
Subjective   Sarah Beth Prince is a 58 y.o. female  Alopecia and Allergies (Recently had allergy testing done and is taking Allegra and Singulair but eyes are itchy, watery, feels like sand is in them, and puffy below eyes. Benadryl not effective.)      History of Present Illness  Patient is a 58-year-old female who comes in planing of some alopecia dry scalp also complains allergic rhinitis is taking Allegra and Singulair the eyes are itching watery    Patient also complains of watery itchy eyes,    Patient claims dry scaliness the With some hair loss on scalp.  Patient has been going on for some time creased over the last couple of months  The following portions of the patient's history were reviewed and updated as appropriate: allergies, current medications, past social history and problem list    Review of Systems   Constitutional:  Negative for chills, fatigue and fever.   HENT:  Positive for congestion, ear pain, postnasal drip, rhinorrhea and sinus pressure. Negative for sore throat.    Eyes:  Positive for discharge, redness and itching. Negative for pain.   Respiratory:  Positive for cough. Negative for shortness of breath.    Gastrointestinal: Negative.    Genitourinary: Negative.    Musculoskeletal:  Negative for myalgias.   Neurological:  Positive for light-headedness and headaches. Negative for dizziness.   Hematological:  Negative for adenopathy.   Psychiatric/Behavioral:  Positive for sleep disturbance.        Objective     Vitals:    10/26/23 0916   BP: 144/82   Pulse: 91   Resp: 14   SpO2: 98%       Physical Exam  Vitals and nursing note reviewed.   Constitutional:       General: She is not in acute distress.     Appearance: She is well-developed. She is not diaphoretic.   HENT:      Head: Normocephalic and atraumatic.      Right Ear: External ear normal.      Left Ear: External ear normal.      Nose: Nose normal.   Eyes:      Conjunctiva/sclera: Conjunctivae normal.      Comments:  Injected eyes   Cardiovascular:      Rate and Rhythm: Normal rate and regular rhythm.      Heart sounds: Normal heart sounds.   Pulmonary:      Effort: Pulmonary effort is normal.      Breath sounds: Normal breath sounds.         Assessment & Plan     Diagnoses and all orders for this visit:    1. Allergic rhinitis, unspecified seasonality, unspecified trigger (Primary)  -     montelukast (Singulair) 10 MG tablet; Take 1 tablet by mouth Every Night.  Dispense: 30 tablet; Refill: 3  -     levocetirizine (XYZAL) 5 MG tablet; Take 1 tablet by mouth Every Evening.  Dispense: 30 tablet; Refill: 3    2. Dry scalp  -     Ketoconazole 1 % shampoo; Apply as directed  Dispense: 125 mL; Refill: 1    Other orders  -     ketorolac (Acular) 0.5 % ophthalmic solution; Administer 1 drop to both eyes 4 (Four) Times a Day.  Dispense: 3 mL; Refill: 3     I spent 15 minutes in patient care: Reviewing records prior to the visit, examining the patient, entering orders and documentation    Part of this note may be an electronic transcription/translation of spoken language to printed text using the Dragon Dictation System.

## 2023-11-06 ENCOUNTER — OFFICE VISIT (OUTPATIENT)
Dept: ORTHOPEDIC SURGERY | Facility: CLINIC | Age: 58
End: 2023-11-06
Payer: MEDICARE

## 2023-11-06 VITALS
DIASTOLIC BLOOD PRESSURE: 80 MMHG | SYSTOLIC BLOOD PRESSURE: 156 MMHG | BODY MASS INDEX: 22.82 KG/M2 | HEIGHT: 62 IN | WEIGHT: 124 LBS

## 2023-11-06 DIAGNOSIS — M25.571 ACUTE BILATERAL ANKLE PAIN: Primary | ICD-10-CM

## 2023-11-06 DIAGNOSIS — M25.572 ACUTE BILATERAL ANKLE PAIN: Primary | ICD-10-CM

## 2023-11-06 PROCEDURE — 20606 DRAIN/INJ JOINT/BURSA W/US: CPT | Performed by: ORTHOPAEDIC SURGERY

## 2023-11-06 PROCEDURE — 99213 OFFICE O/P EST LOW 20 MIN: CPT | Performed by: ORTHOPAEDIC SURGERY

## 2023-11-06 PROCEDURE — 1159F MED LIST DOCD IN RCRD: CPT | Performed by: ORTHOPAEDIC SURGERY

## 2023-11-06 PROCEDURE — 1160F RVW MEDS BY RX/DR IN RCRD: CPT | Performed by: ORTHOPAEDIC SURGERY

## 2023-11-06 RX ORDER — TRIAMCINOLONE ACETONIDE 40 MG/ML
1 INJECTION, SUSPENSION INTRA-ARTICULAR; INTRAMUSCULAR
Status: COMPLETED | OUTPATIENT
Start: 2023-11-06 | End: 2023-11-06

## 2023-11-06 RX ORDER — ROPIVACAINE HYDROCHLORIDE 5 MG/ML
2 INJECTION, SOLUTION EPIDURAL; INFILTRATION; PERINEURAL
Status: COMPLETED | OUTPATIENT
Start: 2023-11-06 | End: 2023-11-06

## 2023-11-06 RX ADMIN — ROPIVACAINE HYDROCHLORIDE 2 ML: 5 INJECTION, SOLUTION EPIDURAL; INFILTRATION; PERINEURAL at 16:09

## 2023-11-06 RX ADMIN — TRIAMCINOLONE ACETONIDE 1 ML: 40 INJECTION, SUSPENSION INTRA-ARTICULAR; INTRAMUSCULAR at 16:09

## 2023-11-06 NOTE — PROGRESS NOTES
"                          [facility] Office Follow Up     Office Follow Up Visit     Date: 11/06/2023   Patient Name: Sarah Beth Prince  MRN: [id number]  YOB: 1965  Chief Complaint:   Chief Complaint   Patient presents with    Follow-up     2 month f/u; Acute bilateral ankle pain     History of Present Illness:   Sarah Beth Prince is a 58 y.o. female who is here today for follow up for bilateral ankle pain.  States symptoms are similar to previous.  States has not seen rheumatology since last visit.  Did report that she has seen an allergist and confirm she does not have an allergy to steroids.    Subjective   I reviewed the patient's chief complaint, history of present illness, review of systems, past medical history, surgical history, family history, social history, medications and allergy list   Objective    Vital Signs:   Vitals:    11/06/23 1504   BP: 156/80   Weight: 56.2 kg (124 lb)   Height: 157.5 cm (62\")     Body mass index is 22.68 kg/m².    Ortho Exam:  bilateral LE Foot and Ankle Exam:   Slightly antalgic gait pattern.  Hindfoot alignment is neutral. Plantigrade foot.   Neurological exam of the superficial peroneal, deep peroneal, plantar, sural and saphenous nerves demonstrates intact sensation and normal motor function.   Peripheral pulses including posterior tibial artery and deep peroneal artery are intact and palpable. There is good perfusion to the toes.   The skin is intact throughout the foot and ankle without ulceration.   Range of motion of ankle, subtalar joint, midfoot and toes is within normal limits.   Patient is diffusely tender about the tibiotalar joint both anteriorly and posteriorly.  Also with some tenderness to palpation over the malleoli with pressure.    Results Review:  No new imaging    Assessment / Plan    Assessment/Plan:   Diagnoses and all orders for this visit:    1. Acute bilateral ankle pain (Primary)  -     US Guided " Injection  -     US Guided Injection    Other orders  -     - Medium Joint Arthrocentesis: bilateral ankle      Returns for persistent bilateral ankle pain.  Patient provided with intra-articular steroid injections in the clinic today.  Once again had a discussion with patient that it seems like her pain is likely rheumatologic in nature and recommend she follows up with rheumatology for further medical management.  Told her I could do injections again in the future but no sooner than every 3 months.  Also recommended patient take anti-inflammatories for treatment of her symptoms as well.  Patient expressed understanding.  Was a pleasure seeing her today.    I discussed with the patient the potential benefits of performing therapeutic injections as well as potential risks including but not limited to infection, swelling, pain, bleeding, bruising, nerve/vessel damage, skin color changes, transient elevation in blood glucose levels, and fat atrophy. After informed consent  a steroid injection was given, see separate procedure note.     Follow Up:   Return if symptoms worsen or fail to improve.      Rowdy Nolan MD  [facility] Orthopedic Surgeon

## 2023-11-06 NOTE — PROGRESS NOTES
Procedure   - Medium Joint Arthrocentesis: bilateral ankle on 11/6/2023 4:09 PM  Indications: pain  Details: (23G) needle, ultrasound-guided anteromedial approach  Medications (Right): 2 mL ropivacaine 0.5 %; 1 mL triamcinolone acetonide 40 MG/ML  Medications (Left): 2 mL ropivacaine 0.5 %; 1 mL triamcinolone acetonide 40 MG/ML  Outcome: tolerated well, no immediate complications  Procedure, treatment alternatives, risks and benefits explained, specific risks discussed. Consent was given by the patient. Immediately prior to procedure a time out was called to verify the correct patient, procedure, equipment, support staff and site/side marked as required. Patient was prepped and draped in the usual sterile fashion.

## 2023-12-18 RX ORDER — HYDROCHLOROTHIAZIDE 25 MG/1
25 TABLET ORAL DAILY
Qty: 30 TABLET | Refills: 2 | Status: SHIPPED | OUTPATIENT
Start: 2023-12-18

## 2025-06-10 ENCOUNTER — TRANSCRIBE ORDERS (OUTPATIENT)
Dept: ADMINISTRATIVE | Facility: HOSPITAL | Age: 60
End: 2025-06-10
Payer: MEDICARE

## 2025-06-10 DIAGNOSIS — R19.00 ABDOMINAL SWELLING: Primary | ICD-10-CM
